# Patient Record
Sex: FEMALE | Race: WHITE | NOT HISPANIC OR LATINO | Employment: FULL TIME | RURAL
[De-identification: names, ages, dates, MRNs, and addresses within clinical notes are randomized per-mention and may not be internally consistent; named-entity substitution may affect disease eponyms.]

---

## 2020-06-23 ENCOUNTER — HISTORICAL (OUTPATIENT)
Dept: ADMINISTRATIVE | Facility: HOSPITAL | Age: 65
End: 2020-06-23

## 2020-12-02 ENCOUNTER — HISTORICAL (OUTPATIENT)
Dept: ADMINISTRATIVE | Facility: HOSPITAL | Age: 65
End: 2020-12-02

## 2021-06-02 ENCOUNTER — HOSPITAL ENCOUNTER (OUTPATIENT)
Dept: RADIOLOGY | Facility: HOSPITAL | Age: 66
Discharge: HOME | End: 2021-06-02
Attending: ORTHOPAEDIC SURGERY
Payer: COMMERCIAL

## 2021-06-02 DIAGNOSIS — M25.562 ACUTE PAIN OF LEFT KNEE: ICD-10-CM

## 2021-06-02 PROCEDURE — 73562 X-RAY EXAM OF KNEE 3: CPT | Mod: TC,LT

## 2021-06-21 PROBLEM — S83.242A ACUTE MEDIAL MENISCUS TEAR OF LEFT KNEE: Status: ACTIVE | Noted: 2021-06-21

## 2021-07-08 ENCOUNTER — HOSPITAL ENCOUNTER (OUTPATIENT)
Facility: HOSPITAL | Age: 66
Discharge: HOME | End: 2021-07-08
Attending: ORTHOPAEDIC SURGERY | Admitting: ORTHOPAEDIC SURGERY
Payer: COMMERCIAL

## 2021-07-08 ENCOUNTER — ANESTHESIA (OUTPATIENT)
Dept: SURGERY | Facility: HOSPITAL | Age: 66
End: 2021-07-08
Payer: COMMERCIAL

## 2021-07-08 ENCOUNTER — ANESTHESIA EVENT (OUTPATIENT)
Dept: SURGERY | Facility: HOSPITAL | Age: 66
End: 2021-07-08
Payer: COMMERCIAL

## 2021-07-08 VITALS
WEIGHT: 141 LBS | BODY MASS INDEX: 23.49 KG/M2 | HEIGHT: 65 IN | HEART RATE: 80 BPM | OXYGEN SATURATION: 92 % | RESPIRATION RATE: 16 BRPM | TEMPERATURE: 98 F | SYSTOLIC BLOOD PRESSURE: 141 MMHG | DIASTOLIC BLOOD PRESSURE: 84 MMHG

## 2021-07-08 DIAGNOSIS — M23.204 OLD COMPLEX TEAR OF MEDIAL MENISCUS OF LEFT KNEE: Primary | ICD-10-CM

## 2021-07-08 PROCEDURE — D9220A PRA ANESTHESIA: Mod: ANES,,, | Performed by: ANESTHESIOLOGY

## 2021-07-08 PROCEDURE — 63600175 PHARM REV CODE 636 W HCPCS: Performed by: NURSE ANESTHETIST, CERTIFIED REGISTERED

## 2021-07-08 PROCEDURE — D9220A PRA ANESTHESIA: ICD-10-PCS | Mod: ANES,,, | Performed by: ANESTHESIOLOGY

## 2021-07-08 PROCEDURE — 36000711: Performed by: ORTHOPAEDIC SURGERY

## 2021-07-08 PROCEDURE — 27000655: Performed by: ANESTHESIOLOGY

## 2021-07-08 PROCEDURE — 25000003 PHARM REV CODE 250: Performed by: NURSE ANESTHETIST, CERTIFIED REGISTERED

## 2021-07-08 PROCEDURE — 27000716 HC OXISENSOR PROBE, ANY SIZE: Performed by: ANESTHESIOLOGY

## 2021-07-08 PROCEDURE — 71000015 HC POSTOP RECOV 1ST HR: Performed by: ORTHOPAEDIC SURGERY

## 2021-07-08 PROCEDURE — 97161 PT EVAL LOW COMPLEX 20 MIN: CPT

## 2021-07-08 PROCEDURE — 37000008 HC ANESTHESIA 1ST 15 MINUTES: Performed by: ORTHOPAEDIC SURGERY

## 2021-07-08 PROCEDURE — 25000003 PHARM REV CODE 250: Performed by: ANESTHESIOLOGY

## 2021-07-08 PROCEDURE — 36000710: Performed by: ORTHOPAEDIC SURGERY

## 2021-07-08 PROCEDURE — D9220A PRA ANESTHESIA: Mod: CRNA,,, | Performed by: NURSE ANESTHETIST, CERTIFIED REGISTERED

## 2021-07-08 PROCEDURE — 27201423 OPTIME MED/SURG SUP & DEVICES STERILE SUPPLY: Performed by: ORTHOPAEDIC SURGERY

## 2021-07-08 PROCEDURE — 27000260 *HC AIRWAY ORAL: Performed by: ANESTHESIOLOGY

## 2021-07-08 PROCEDURE — 25000003 PHARM REV CODE 250: Performed by: ORTHOPAEDIC SURGERY

## 2021-07-08 PROCEDURE — D9220A PRA ANESTHESIA: ICD-10-PCS | Mod: CRNA,,, | Performed by: NURSE ANESTHETIST, CERTIFIED REGISTERED

## 2021-07-08 PROCEDURE — 27000177 HC AIRWAY, LARYNGEAL MASK: Performed by: ANESTHESIOLOGY

## 2021-07-08 PROCEDURE — 71000033 HC RECOVERY, INTIAL HOUR: Performed by: ORTHOPAEDIC SURGERY

## 2021-07-08 PROCEDURE — 37000009 HC ANESTHESIA EA ADD 15 MINS: Performed by: ORTHOPAEDIC SURGERY

## 2021-07-08 PROCEDURE — 71000016 HC POSTOP RECOV ADDL HR: Performed by: ORTHOPAEDIC SURGERY

## 2021-07-08 PROCEDURE — 27000510 HC BLANKET BAIR HUGGER ANY SIZE: Performed by: ANESTHESIOLOGY

## 2021-07-08 RX ORDER — MIDAZOLAM HYDROCHLORIDE 1 MG/ML
INJECTION INTRAMUSCULAR; INTRAVENOUS
Status: DISCONTINUED | OUTPATIENT
Start: 2021-07-08 | End: 2021-07-08

## 2021-07-08 RX ORDER — DIPHENHYDRAMINE HYDROCHLORIDE 50 MG/ML
25 INJECTION INTRAMUSCULAR; INTRAVENOUS EVERY 6 HOURS PRN
Status: DISCONTINUED | OUTPATIENT
Start: 2021-07-08 | End: 2021-07-08 | Stop reason: HOSPADM

## 2021-07-08 RX ORDER — DIMENHYDRINATE 50 MG
50 TABLET ORAL ONCE
Status: COMPLETED | OUTPATIENT
Start: 2021-07-08 | End: 2021-07-08

## 2021-07-08 RX ORDER — OXYCODONE HYDROCHLORIDE 5 MG/1
5 TABLET ORAL
Status: DISCONTINUED | OUTPATIENT
Start: 2021-07-08 | End: 2021-07-08 | Stop reason: HOSPADM

## 2021-07-08 RX ORDER — ONDANSETRON 4 MG/1
8 TABLET, ORALLY DISINTEGRATING ORAL EVERY 8 HOURS PRN
Status: DISCONTINUED | OUTPATIENT
Start: 2021-07-08 | End: 2021-07-08 | Stop reason: HOSPADM

## 2021-07-08 RX ORDER — CEFAZOLIN SODIUM 2 G/50ML
2 SOLUTION INTRAVENOUS
Status: DISCONTINUED | OUTPATIENT
Start: 2021-07-08 | End: 2021-07-08 | Stop reason: HOSPADM

## 2021-07-08 RX ORDER — MEPERIDINE HYDROCHLORIDE 25 MG/ML
25 INJECTION INTRAMUSCULAR; INTRAVENOUS; SUBCUTANEOUS EVERY 10 MIN PRN
Status: DISCONTINUED | OUTPATIENT
Start: 2021-07-08 | End: 2021-07-08 | Stop reason: HOSPADM

## 2021-07-08 RX ORDER — DEXAMETHASONE SODIUM PHOSPHATE 100 MG/10ML
INJECTION INTRAMUSCULAR; INTRAVENOUS
Status: DISCONTINUED | OUTPATIENT
Start: 2021-07-08 | End: 2021-07-08

## 2021-07-08 RX ORDER — CEFAZOLIN SODIUM 1 G/3ML
INJECTION, POWDER, FOR SOLUTION INTRAMUSCULAR; INTRAVENOUS
Status: DISCONTINUED | OUTPATIENT
Start: 2021-07-08 | End: 2021-07-08

## 2021-07-08 RX ORDER — ONDANSETRON 2 MG/ML
4 INJECTION INTRAMUSCULAR; INTRAVENOUS DAILY PRN
Status: DISCONTINUED | OUTPATIENT
Start: 2021-07-08 | End: 2021-07-08 | Stop reason: HOSPADM

## 2021-07-08 RX ORDER — FENTANYL CITRATE 50 UG/ML
INJECTION, SOLUTION INTRAMUSCULAR; INTRAVENOUS
Status: DISCONTINUED | OUTPATIENT
Start: 2021-07-08 | End: 2021-07-08

## 2021-07-08 RX ORDER — HYDROCODONE BITARTRATE AND ACETAMINOPHEN 5; 325 MG/1; MG/1
1 TABLET ORAL EVERY 4 HOURS PRN
Status: DISCONTINUED | OUTPATIENT
Start: 2021-07-08 | End: 2021-07-08 | Stop reason: HOSPADM

## 2021-07-08 RX ORDER — ACETAMINOPHEN 500 MG
1000 TABLET ORAL EVERY 6 HOURS PRN
Status: DISCONTINUED | OUTPATIENT
Start: 2021-07-08 | End: 2021-07-08 | Stop reason: HOSPADM

## 2021-07-08 RX ORDER — HYDROCODONE BITARTRATE AND ACETAMINOPHEN 5; 325 MG/1; MG/1
1 TABLET ORAL EVERY 6 HOURS PRN
Qty: 28 TABLET | Refills: 0 | Status: SHIPPED | OUTPATIENT
Start: 2021-07-08 | End: 2021-07-15

## 2021-07-08 RX ORDER — LIDOCAINE HYDROCHLORIDE 20 MG/ML
INJECTION, SOLUTION EPIDURAL; INFILTRATION; INTRACAUDAL; PERINEURAL
Status: DISCONTINUED | OUTPATIENT
Start: 2021-07-08 | End: 2021-07-08

## 2021-07-08 RX ORDER — SODIUM CHLORIDE 9 MG/ML
INJECTION, SOLUTION INTRAVENOUS CONTINUOUS
Status: DISCONTINUED | OUTPATIENT
Start: 2021-07-08 | End: 2021-07-08 | Stop reason: HOSPADM

## 2021-07-08 RX ORDER — MORPHINE SULFATE 10 MG/ML
4 INJECTION INTRAMUSCULAR; INTRAVENOUS; SUBCUTANEOUS EVERY 5 MIN PRN
Status: DISCONTINUED | OUTPATIENT
Start: 2021-07-08 | End: 2021-07-08 | Stop reason: HOSPADM

## 2021-07-08 RX ORDER — SCOLOPAMINE TRANSDERMAL SYSTEM 1 MG/1
1 PATCH, EXTENDED RELEASE TRANSDERMAL ONCE
Status: DISCONTINUED | OUTPATIENT
Start: 2021-07-08 | End: 2021-07-08 | Stop reason: HOSPADM

## 2021-07-08 RX ORDER — PROMETHAZINE HYDROCHLORIDE 25 MG/1
25 TABLET ORAL EVERY 6 HOURS PRN
Status: DISCONTINUED | OUTPATIENT
Start: 2021-07-08 | End: 2021-07-08 | Stop reason: HOSPADM

## 2021-07-08 RX ORDER — ONDANSETRON 2 MG/ML
INJECTION INTRAMUSCULAR; INTRAVENOUS
Status: DISCONTINUED | OUTPATIENT
Start: 2021-07-08 | End: 2021-07-08

## 2021-07-08 RX ORDER — HYDROCODONE BITARTRATE AND ACETAMINOPHEN 10; 325 MG/1; MG/1
1 TABLET ORAL EVERY 4 HOURS PRN
Status: DISCONTINUED | OUTPATIENT
Start: 2021-07-08 | End: 2021-07-08 | Stop reason: HOSPADM

## 2021-07-08 RX ORDER — HYDROMORPHONE HYDROCHLORIDE 2 MG/ML
0.5 INJECTION, SOLUTION INTRAMUSCULAR; INTRAVENOUS; SUBCUTANEOUS EVERY 5 MIN PRN
Status: DISCONTINUED | OUTPATIENT
Start: 2021-07-08 | End: 2021-07-08 | Stop reason: HOSPADM

## 2021-07-08 RX ORDER — PROPOFOL 10 MG/ML
VIAL (ML) INTRAVENOUS
Status: DISCONTINUED | OUTPATIENT
Start: 2021-07-08 | End: 2021-07-08

## 2021-07-08 RX ADMIN — ONDANSETRON 4 MG: 2 INJECTION INTRAMUSCULAR; INTRAVENOUS at 01:07

## 2021-07-08 RX ADMIN — SODIUM CHLORIDE: 9 INJECTION, SOLUTION INTRAVENOUS at 10:07

## 2021-07-08 RX ADMIN — FENTANYL CITRATE 50 MCG: 50 INJECTION INTRAMUSCULAR; INTRAVENOUS at 01:07

## 2021-07-08 RX ADMIN — DEXAMETHASONE SODIUM PHOSPHATE 8 MG: 10 INJECTION INTRAMUSCULAR; INTRAVENOUS at 01:07

## 2021-07-08 RX ADMIN — LIDOCAINE HYDROCHLORIDE 100 MG: 20 INJECTION, SOLUTION INTRAVENOUS at 12:07

## 2021-07-08 RX ADMIN — MIDAZOLAM HYDROCHLORIDE 2 MG: 1 INJECTION, SOLUTION INTRAMUSCULAR; INTRAVENOUS at 12:07

## 2021-07-08 RX ADMIN — PROPOFOL 150 MG: 10 INJECTION, EMULSION INTRAVENOUS at 12:07

## 2021-07-08 RX ADMIN — SODIUM CHLORIDE: 9 INJECTION, SOLUTION INTRAVENOUS at 01:07

## 2021-07-08 RX ADMIN — DIMENHYDRINATE 50 MG: 50 TABLET ORAL at 12:07

## 2021-07-08 RX ADMIN — SCOPALAMINE 1 PATCH: 1 PATCH, EXTENDED RELEASE TRANSDERMAL at 12:07

## 2021-07-08 RX ADMIN — CEFAZOLIN 2 G: 1 INJECTION, POWDER, FOR SOLUTION INTRAMUSCULAR; INTRAVENOUS; PARENTERAL at 01:07

## 2021-07-22 PROBLEM — Z09 FOLLOW UP: Status: ACTIVE | Noted: 2021-07-22

## 2021-07-27 PROBLEM — G89.18 POSTOPERATIVE PAIN: Status: ACTIVE | Noted: 2021-07-27

## 2021-08-18 PROBLEM — M17.12 PRIMARY OSTEOARTHRITIS OF LEFT KNEE: Status: ACTIVE | Noted: 2021-08-18

## 2021-11-01 PROBLEM — G89.18 POSTOPERATIVE PAIN: Status: RESOLVED | Noted: 2021-07-27 | Resolved: 2021-11-01

## 2021-11-08 PROBLEM — Z09 POSTOP CHECK: Status: RESOLVED | Noted: 2021-07-22 | Resolved: 2021-11-08

## 2021-11-16 ENCOUNTER — OFFICE VISIT (OUTPATIENT)
Dept: FAMILY MEDICINE | Facility: CLINIC | Age: 66
End: 2021-11-16
Payer: COMMERCIAL

## 2021-11-16 VITALS
OXYGEN SATURATION: 95 % | RESPIRATION RATE: 18 BRPM | BODY MASS INDEX: 23.49 KG/M2 | HEART RATE: 105 BPM | SYSTOLIC BLOOD PRESSURE: 122 MMHG | TEMPERATURE: 99 F | DIASTOLIC BLOOD PRESSURE: 80 MMHG | HEIGHT: 65 IN | WEIGHT: 141 LBS

## 2021-11-16 DIAGNOSIS — J06.9 UPPER RESPIRATORY TRACT INFECTION, UNSPECIFIED TYPE: ICD-10-CM

## 2021-11-16 DIAGNOSIS — R50.9 FEVER, UNSPECIFIED FEVER CAUSE: Primary | ICD-10-CM

## 2021-11-16 LAB
CTP QC/QA: YES
FLUAV AG NPH QL: NEGATIVE
FLUBV AG NPH QL: NEGATIVE
SARS-COV-2 AG RESP QL IA.RAPID: NEGATIVE

## 2021-11-16 PROCEDURE — 3079F PR MOST RECENT DIASTOLIC BLOOD PRESSURE 80-89 MM HG: ICD-10-PCS | Mod: CPTII,,, | Performed by: FAMILY MEDICINE

## 2021-11-16 PROCEDURE — 99213 PR OFFICE/OUTPT VISIT, EST, LEVL III, 20-29 MIN: ICD-10-PCS | Mod: ,,, | Performed by: FAMILY MEDICINE

## 2021-11-16 PROCEDURE — 3079F DIAST BP 80-89 MM HG: CPT | Mod: CPTII,,, | Performed by: FAMILY MEDICINE

## 2021-11-16 PROCEDURE — 1159F MED LIST DOCD IN RCRD: CPT | Mod: CPTII,,, | Performed by: FAMILY MEDICINE

## 2021-11-16 PROCEDURE — 3074F PR MOST RECENT SYSTOLIC BLOOD PRESSURE < 130 MM HG: ICD-10-PCS | Mod: CPTII,,, | Performed by: FAMILY MEDICINE

## 2021-11-16 PROCEDURE — 87428 POCT SARS-COV2 (COVID) WITH FLU ANTIGEN: ICD-10-PCS | Mod: QW,,, | Performed by: FAMILY MEDICINE

## 2021-11-16 PROCEDURE — 3008F PR BODY MASS INDEX (BMI) DOCUMENTED: ICD-10-PCS | Mod: CPTII,,, | Performed by: FAMILY MEDICINE

## 2021-11-16 PROCEDURE — 3074F SYST BP LT 130 MM HG: CPT | Mod: CPTII,,, | Performed by: FAMILY MEDICINE

## 2021-11-16 PROCEDURE — 3008F BODY MASS INDEX DOCD: CPT | Mod: CPTII,,, | Performed by: FAMILY MEDICINE

## 2021-11-16 PROCEDURE — 99213 OFFICE O/P EST LOW 20 MIN: CPT | Mod: ,,, | Performed by: FAMILY MEDICINE

## 2021-11-16 PROCEDURE — 1159F PR MEDICATION LIST DOCUMENTED IN MEDICAL RECORD: ICD-10-PCS | Mod: CPTII,,, | Performed by: FAMILY MEDICINE

## 2021-11-16 PROCEDURE — 87428 SARSCOV & INF VIR A&B AG IA: CPT | Mod: QW,,, | Performed by: FAMILY MEDICINE

## 2021-11-16 RX ORDER — AMOXICILLIN AND CLAVULANATE POTASSIUM 875; 125 MG/1; MG/1
1 TABLET, FILM COATED ORAL EVERY 12 HOURS
Qty: 14 TABLET | Refills: 0 | Status: SHIPPED | OUTPATIENT
Start: 2021-11-16 | End: 2021-11-23

## 2021-11-16 RX ORDER — IPRATROPIUM BROMIDE 42 UG/1
2 SPRAY, METERED NASAL 3 TIMES DAILY
Qty: 15 ML | Refills: 1 | Status: SHIPPED | OUTPATIENT
Start: 2021-11-16 | End: 2022-06-28

## 2021-11-16 RX ORDER — GUAIFENESIN/DEXTROMETHORPHAN 100-10MG/5
5 SYRUP ORAL EVERY 6 HOURS PRN
Qty: 236 ML | Refills: 1 | Status: SHIPPED | OUTPATIENT
Start: 2021-11-16 | End: 2021-11-26

## 2021-12-08 ENCOUNTER — HOSPITAL ENCOUNTER (OUTPATIENT)
Dept: RADIOLOGY | Facility: HOSPITAL | Age: 66
Discharge: HOME | End: 2021-12-08
Attending: RADIOLOGY
Payer: COMMERCIAL

## 2021-12-08 ENCOUNTER — OFFICE VISIT (OUTPATIENT)
Dept: OBSTETRICS AND GYNECOLOGY | Facility: CLINIC | Age: 66
End: 2021-12-08
Payer: COMMERCIAL

## 2021-12-08 VITALS — WEIGHT: 141 LBS | HEIGHT: 65 IN | BODY MASS INDEX: 23.49 KG/M2

## 2021-12-08 VITALS
DIASTOLIC BLOOD PRESSURE: 72 MMHG | SYSTOLIC BLOOD PRESSURE: 136 MMHG | WEIGHT: 138 LBS | HEIGHT: 65 IN | BODY MASS INDEX: 22.99 KG/M2

## 2021-12-08 DIAGNOSIS — Z01.419 ENCNTR FOR GYN EXAM (GENERAL) (ROUTINE) W/O ABN FINDINGS: ICD-10-CM

## 2021-12-08 DIAGNOSIS — Z12.31 SCREENING MAMMOGRAM, ENCOUNTER FOR: ICD-10-CM

## 2021-12-08 DIAGNOSIS — Z12.4 SCREENING FOR MALIGNANT NEOPLASM OF THE CERVIX: Primary | ICD-10-CM

## 2021-12-08 PROCEDURE — 99213 OFFICE O/P EST LOW 20 MIN: CPT | Mod: PBBFAC | Performed by: OBSTETRICS & GYNECOLOGY

## 2021-12-08 PROCEDURE — 99396 PREV VISIT EST AGE 40-64: CPT | Mod: S$PBB,,, | Performed by: OBSTETRICS & GYNECOLOGY

## 2021-12-08 PROCEDURE — 87624 HUMAN PAPILLOMAVIRUS (HPV): ICD-10-PCS | Mod: ,,, | Performed by: CLINICAL MEDICAL LABORATORY

## 2021-12-08 PROCEDURE — 88142 CYTOPATH C/V THIN LAYER: CPT | Mod: GCY | Performed by: OBSTETRICS & GYNECOLOGY

## 2021-12-08 PROCEDURE — 87624 HPV HI-RISK TYP POOLED RSLT: CPT | Mod: ,,, | Performed by: CLINICAL MEDICAL LABORATORY

## 2021-12-08 PROCEDURE — 77067 SCR MAMMO BI INCL CAD: CPT | Mod: TC

## 2021-12-08 PROCEDURE — 99396 PR PREVENTIVE VISIT,EST,40-64: ICD-10-PCS | Mod: S$PBB,,, | Performed by: OBSTETRICS & GYNECOLOGY

## 2021-12-10 LAB
GH SERPL-MCNC: NORMAL NG/ML
INSULIN SERPL-ACNC: NORMAL U[IU]/ML
LAB AP CLINICAL INFORMATION: NORMAL
LAB AP GYN INTERPRETATION: NEGATIVE
LAB AP PAP DISCLAIMER COMMENTS: NORMAL
RENIN PLAS-CCNC: NORMAL NG/ML/H

## 2021-12-11 LAB
HPV 16: NEGATIVE
HPV 18: NEGATIVE
HPV OTHER: NEGATIVE

## 2022-06-15 ENCOUNTER — OFFICE VISIT (OUTPATIENT)
Dept: FAMILY MEDICINE | Facility: CLINIC | Age: 67
End: 2022-06-15
Payer: COMMERCIAL

## 2022-06-15 VITALS
OXYGEN SATURATION: 96 % | WEIGHT: 132.5 LBS | DIASTOLIC BLOOD PRESSURE: 88 MMHG | BODY MASS INDEX: 22.08 KG/M2 | RESPIRATION RATE: 18 BRPM | TEMPERATURE: 98 F | HEART RATE: 88 BPM | HEIGHT: 65 IN | SYSTOLIC BLOOD PRESSURE: 144 MMHG

## 2022-06-15 DIAGNOSIS — R53.83 FATIGUE, UNSPECIFIED TYPE: Primary | ICD-10-CM

## 2022-06-15 DIAGNOSIS — Z13.1 ENCOUNTER FOR SCREENING FOR DIABETES MELLITUS: ICD-10-CM

## 2022-06-15 DIAGNOSIS — Z13.220 SCREENING FOR LIPOID DISORDERS: ICD-10-CM

## 2022-06-15 LAB
25(OH)D3 SERPL-MCNC: 27.3 NG/ML
BASOPHILS # BLD AUTO: 0.04 K/UL (ref 0–0.2)
BASOPHILS NFR BLD AUTO: 0.6 % (ref 0–1)
CHOLEST SERPL-MCNC: 225 MG/DL (ref 0–200)
CHOLEST/HDLC SERPL: 2.9 {RATIO}
DIFFERENTIAL METHOD BLD: ABNORMAL
EOSINOPHIL # BLD AUTO: 0.13 K/UL (ref 0–0.5)
EOSINOPHIL NFR BLD AUTO: 2.1 % (ref 1–4)
ERYTHROCYTE [DISTWIDTH] IN BLOOD BY AUTOMATED COUNT: 13.2 % (ref 11.5–14.5)
GLUCOSE SERPL-MCNC: 104 MG/DL (ref 74–106)
HCT VFR BLD AUTO: 43 % (ref 38–47)
HDLC SERPL-MCNC: 78 MG/DL (ref 40–60)
HGB BLD-MCNC: 14.1 G/DL (ref 12–16)
IMM GRANULOCYTES # BLD AUTO: 0.02 K/UL (ref 0–0.04)
IMM GRANULOCYTES NFR BLD: 0.3 % (ref 0–0.4)
LDLC SERPL CALC-MCNC: 129 MG/DL
LDLC/HDLC SERPL: 1.7 {RATIO}
LYMPHOCYTES # BLD AUTO: 1.39 K/UL (ref 1–4.8)
LYMPHOCYTES NFR BLD AUTO: 22.4 % (ref 27–41)
MCH RBC QN AUTO: 29.3 PG (ref 27–31)
MCHC RBC AUTO-ENTMCNC: 32.8 G/DL (ref 32–36)
MCV RBC AUTO: 89.2 FL (ref 80–96)
MONOCYTES # BLD AUTO: 0.41 K/UL (ref 0–0.8)
MONOCYTES NFR BLD AUTO: 6.6 % (ref 2–6)
MPC BLD CALC-MCNC: 11.3 FL (ref 9.4–12.4)
NEUTROPHILS # BLD AUTO: 4.22 K/UL (ref 1.8–7.7)
NEUTROPHILS NFR BLD AUTO: 68 % (ref 53–65)
NONHDLC SERPL-MCNC: 147 MG/DL
NRBC # BLD AUTO: 0 X10E3/UL
NRBC, AUTO (.00): 0 %
PLATELET # BLD AUTO: 271 K/UL (ref 150–400)
RBC # BLD AUTO: 4.82 M/UL (ref 4.2–5.4)
TRIGL SERPL-MCNC: 91 MG/DL (ref 35–150)
TSH SERPL DL<=0.005 MIU/L-ACNC: 1.17 UIU/ML (ref 0.36–3.74)
VIT B12 SERPL-MCNC: 437 PG/ML (ref 193–986)
VLDLC SERPL-MCNC: 18 MG/DL
WBC # BLD AUTO: 6.21 K/UL (ref 4.5–11)

## 2022-06-15 PROCEDURE — 84443 ASSAY THYROID STIM HORMONE: CPT | Mod: ,,, | Performed by: CLINICAL MEDICAL LABORATORY

## 2022-06-15 PROCEDURE — 99396 PR PREVENTIVE VISIT,EST,40-64: ICD-10-PCS | Mod: ,,, | Performed by: NURSE PRACTITIONER

## 2022-06-15 PROCEDURE — 3077F SYST BP >= 140 MM HG: CPT | Mod: ,,, | Performed by: NURSE PRACTITIONER

## 2022-06-15 PROCEDURE — 80061 LIPID PANEL: CPT | Mod: ,,, | Performed by: CLINICAL MEDICAL LABORATORY

## 2022-06-15 PROCEDURE — 82607 VITAMIN B-12: CPT | Mod: ,,, | Performed by: CLINICAL MEDICAL LABORATORY

## 2022-06-15 PROCEDURE — 84443 TSH: ICD-10-PCS | Mod: ,,, | Performed by: CLINICAL MEDICAL LABORATORY

## 2022-06-15 PROCEDURE — 82947 GLUCOSE, FASTING: ICD-10-PCS | Mod: ,,, | Performed by: CLINICAL MEDICAL LABORATORY

## 2022-06-15 PROCEDURE — 3008F PR BODY MASS INDEX (BMI) DOCUMENTED: ICD-10-PCS | Mod: ,,, | Performed by: NURSE PRACTITIONER

## 2022-06-15 PROCEDURE — 1159F MED LIST DOCD IN RCRD: CPT | Mod: ,,, | Performed by: NURSE PRACTITIONER

## 2022-06-15 PROCEDURE — 82306 VITAMIN D: ICD-10-PCS | Mod: ,,, | Performed by: CLINICAL MEDICAL LABORATORY

## 2022-06-15 PROCEDURE — 85025 COMPLETE CBC W/AUTO DIFF WBC: CPT | Mod: ,,, | Performed by: CLINICAL MEDICAL LABORATORY

## 2022-06-15 PROCEDURE — 1159F PR MEDICATION LIST DOCUMENTED IN MEDICAL RECORD: ICD-10-PCS | Mod: ,,, | Performed by: NURSE PRACTITIONER

## 2022-06-15 PROCEDURE — 82306 VITAMIN D 25 HYDROXY: CPT | Mod: ,,, | Performed by: CLINICAL MEDICAL LABORATORY

## 2022-06-15 PROCEDURE — 3077F PR MOST RECENT SYSTOLIC BLOOD PRESSURE >= 140 MM HG: ICD-10-PCS | Mod: ,,, | Performed by: NURSE PRACTITIONER

## 2022-06-15 PROCEDURE — 85025 CBC WITH DIFFERENTIAL: ICD-10-PCS | Mod: ,,, | Performed by: CLINICAL MEDICAL LABORATORY

## 2022-06-15 PROCEDURE — 80061 LIPID PANEL: ICD-10-PCS | Mod: ,,, | Performed by: CLINICAL MEDICAL LABORATORY

## 2022-06-15 PROCEDURE — 3008F BODY MASS INDEX DOCD: CPT | Mod: ,,, | Performed by: NURSE PRACTITIONER

## 2022-06-15 PROCEDURE — 3079F PR MOST RECENT DIASTOLIC BLOOD PRESSURE 80-89 MM HG: ICD-10-PCS | Mod: ,,, | Performed by: NURSE PRACTITIONER

## 2022-06-15 PROCEDURE — 82607 VITAMIN B12: ICD-10-PCS | Mod: ,,, | Performed by: CLINICAL MEDICAL LABORATORY

## 2022-06-15 PROCEDURE — 82947 ASSAY GLUCOSE BLOOD QUANT: CPT | Mod: ,,, | Performed by: CLINICAL MEDICAL LABORATORY

## 2022-06-15 PROCEDURE — 99396 PREV VISIT EST AGE 40-64: CPT | Mod: ,,, | Performed by: NURSE PRACTITIONER

## 2022-06-15 PROCEDURE — 3079F DIAST BP 80-89 MM HG: CPT | Mod: ,,, | Performed by: NURSE PRACTITIONER

## 2022-06-15 NOTE — PROGRESS NOTES
Shana Olson NP   [facility]  [address] [address] [address]  [facility]     PATIENT NAME: Chaya Walls  : 1955  DATE: 6/15/22  MRN: [id number]      Billing Provider: Shana Olson NP  Level of Service:   Patient PCP Information     Provider PCP Type    Shana Olson NP General          Reason for Visit / Chief Complaint: Healthy You       Update PCP  Update Chief Complaint         History of Present Illness / Problem Focused Workflow     Chaya Walls presents to the clinic   Here for c/o feeling fatigue for brandon 1 year, and for healthy you, pt says she is utd on pap and mammogram    Review of Systems     Review of Systems   Constitutional: Positive for fatigue. Negative for chills and fever.   HENT: Negative for nasal congestion, ear pain, facial swelling, hearing loss, mouth dryness, mouth sores, postnasal drip, rhinorrhea, sinus pressure/congestion and goiter.    Eyes: Negative for discharge and itching.   Respiratory: Negative for cough, shortness of breath and wheezing.    Cardiovascular: Negative for chest pain and leg swelling.   Gastrointestinal: Negative for abdominal pain, change in bowel habit and change in bowel habit.   Genitourinary: Negative for difficulty urinating, dysuria, enuresis, frequency, hematuria and urgency.   Neurological: Negative for dizziness, vertigo, syncope, weakness and headaches.   Psychiatric/Behavioral: Negative for decreased concentration.   All other systems reviewed and are negative.       Medical / Social / Family History     Past Medical History:   Diagnosis Date    Breast cancer     Known health problems: none        Past Surgical History:   Procedure Laterality Date    ARTHROSCOPY OF KNEE Left 2021    Procedure: ARTHROSCOPY, KNEE;  Surgeon: Pino Varma MD;  Location: [facility];  Service: Orthopedics;  Laterality: Left;    BREAST BIOPSY      BREAST LUMPECTOMY      CARDIAC CATHETERIZATION      CHOLECYSTECTOMY      DILATION AND CURETTAGE OF  "UTERUS      with hysteroscopy    LUMPECTOMY,BREAST, WITH RADIOACTIVE SEED LOCALIZATION AND SENTINEL LYMPH NODE BIOPSY Left     SHOULDER ARTHROSCOPY Bilateral     TUBAL LIGATION         Social History  Ms.  reports that she has quit smoking. She has never used smokeless tobacco. She reports that she does not drink alcohol and does not use drugs.    Family History  Ms.'s family history includes Breast cancer in her daughter; Heart disease in her father; Hypertension in her mother; No Known Problems in her brother and sister.    Medications and Allergies     Medications  No outpatient medications have been marked as taking for the 6/15/22 encounter (Office Visit) with Shana Olson NP.       Allergies  Review of patient's allergies indicates:   Allergen Reactions    Sulfa (sulfonamide antibiotics)        Physical Examination     Vitals:    06/15/22 0902   BP: (!) 144/88   BP Location: Right arm   Patient Position: Sitting   BP Method: Medium (Manual)   Pulse: 88   Resp: 18   Temp: 97.9 °F (36.6 °C)   TempSrc: Oral   SpO2: 96%   Weight: 60.1 kg (132 lb 8 oz)   Height: 5' 5" (1.651 m)      Physical Exam  Vitals and nursing note reviewed.   Constitutional:       Appearance: Normal appearance.   HENT:      Head: Normocephalic.      Right Ear: Tympanic membrane, ear canal and external ear normal.      Left Ear: Tympanic membrane, ear canal and external ear normal.      Nose: Nose normal.      Mouth/Throat:      Mouth: Mucous membranes are moist.      Pharynx: Oropharynx is clear.   Eyes:      Extraocular Movements: Extraocular movements intact.      Conjunctiva/sclera: Conjunctivae normal.      Pupils: Pupils are equal, round, and reactive to light.   Cardiovascular:      Rate and Rhythm: Normal rate and regular rhythm.      Pulses: Normal pulses.      Heart sounds: Normal heart sounds.   Pulmonary:      Effort: Pulmonary effort is normal.      Breath sounds: Normal breath sounds.   Abdominal:      General: Bowel " sounds are normal.      Palpations: Abdomen is soft.   Musculoskeletal:         General: Normal range of motion.   Skin:     General: Skin is warm and dry.      Capillary Refill: Capillary refill takes less than 2 seconds.   Neurological:      General: No focal deficit present.      Mental Status: She is alert and oriented to person, place, and time.   Psychiatric:         Mood and Affect: Mood normal.         Behavior: Behavior normal.          Assessment and Plan (including Health Maintenance)      Problem List  [employer]  Document Outside HM   :    Plan: take multivitamin daily, will check cbc, tsh, lipids, vit d and vit b12 today, will call with results as soon as reviewed. rturn to clinic as needed and in 6 months    Fatigue, unspecified type  -     TSH; Future; Expected date: 06/15/2022  -     CBC Auto Differential; Future; Expected date: 06/15/2022  -     Vitamin D; Future; Expected date: 06/15/2022  -     Vitamin B12; Future; Expected date: 06/15/2022    Screening for lipoid disorders  -     Lipid Panel; Future; Expected date: 06/15/2022    Encounter for screening for diabetes mellitus  -     Glucose, Fasting; Future; Expected date: 06/15/2022            Health Maintenance Due   Topic Date Due    Hepatitis C Screening  Never done    COVID-19 Vaccine (1) Never done    HIV Screening  Never done    TETANUS VACCINE  Never done    Shingles Vaccine (1 of 2) Never done       Problem List Items Addressed This Visit    None     Visit Diagnoses     Fatigue, unspecified type    -  Primary    Relevant Orders    TSH    CBC Auto Differential    Vitamin D    Vitamin B12    Screening for lipoid disorders        Relevant Orders    Lipid Panel    Encounter for screening for diabetes mellitus        Relevant Orders    Glucose, Fasting            Health Maintenance Topics with due status: Not Due       Topic Last Completion Date    Colorectal Cancer Screening 01/06/2020    Lipid Panel 07/19/2021    Cervical Cancer  Screening 12/08/2021    Mammogram 12/08/2021    Influenza Vaccine Not Due       Procedures     Future Appointments   Date Time Provider Department Center   12/12/2022  9:00 AM Shana Olson NP [facility]   12/14/2022  9:30 AM [facility]1 [facility] [place]   12/14/2022 10:00 AM Wilner Palomino MD [facility] OBGYN [facility]   6/15/2023  9:00 AM Shana Olson NP [facility]        No follow-ups on file.       Signature:  Shana Olson NP    Date of encounter: 6/15/22

## 2022-06-16 NOTE — PROGRESS NOTES
Vit d is low, needs to be on dosequin 1 tablet daily and recheck in 3 months, chol sl elevated, all other lab is good

## 2022-06-23 RX ORDER — CHOLECALCIFEROL (VITD3)/VIT K2 137.5-2
1 TABLET ORAL DAILY
Qty: 90 TABLET | Refills: 0 | Status: SHIPPED | OUTPATIENT
Start: 2022-06-23 | End: 2022-12-01

## 2022-06-28 ENCOUNTER — OFFICE VISIT (OUTPATIENT)
Dept: FAMILY MEDICINE | Facility: CLINIC | Age: 67
End: 2022-06-28
Payer: COMMERCIAL

## 2022-06-28 VITALS
TEMPERATURE: 99 F | HEART RATE: 80 BPM | RESPIRATION RATE: 20 BRPM | HEIGHT: 65 IN | OXYGEN SATURATION: 94 % | WEIGHT: 135.81 LBS | BODY MASS INDEX: 22.63 KG/M2 | DIASTOLIC BLOOD PRESSURE: 86 MMHG | SYSTOLIC BLOOD PRESSURE: 138 MMHG

## 2022-06-28 DIAGNOSIS — J01.00 ACUTE NON-RECURRENT MAXILLARY SINUSITIS: Primary | ICD-10-CM

## 2022-06-28 DIAGNOSIS — Z20.828 EXPOSURE TO SARS VIRUS: ICD-10-CM

## 2022-06-28 PROCEDURE — 96372 PR INJECTION,THERAP/PROPH/DIAG2ST, IM OR SUBCUT: ICD-10-PCS | Mod: ,,, | Performed by: FAMILY MEDICINE

## 2022-06-28 PROCEDURE — 1159F MED LIST DOCD IN RCRD: CPT | Mod: ,,, | Performed by: FAMILY MEDICINE

## 2022-06-28 PROCEDURE — 3079F PR MOST RECENT DIASTOLIC BLOOD PRESSURE 80-89 MM HG: ICD-10-PCS | Mod: ,,, | Performed by: FAMILY MEDICINE

## 2022-06-28 PROCEDURE — 87428 SARSCOV & INF VIR A&B AG IA: CPT | Mod: QW,,, | Performed by: FAMILY MEDICINE

## 2022-06-28 PROCEDURE — 99213 OFFICE O/P EST LOW 20 MIN: CPT | Mod: 25,,, | Performed by: FAMILY MEDICINE

## 2022-06-28 PROCEDURE — 87428 POCT SARS-COV2 (COVID) WITH FLU ANTIGEN: ICD-10-PCS | Mod: QW,,, | Performed by: FAMILY MEDICINE

## 2022-06-28 PROCEDURE — 3079F DIAST BP 80-89 MM HG: CPT | Mod: ,,, | Performed by: FAMILY MEDICINE

## 2022-06-28 PROCEDURE — 3075F SYST BP GE 130 - 139MM HG: CPT | Mod: ,,, | Performed by: FAMILY MEDICINE

## 2022-06-28 PROCEDURE — 1160F RVW MEDS BY RX/DR IN RCRD: CPT | Mod: ,,, | Performed by: FAMILY MEDICINE

## 2022-06-28 PROCEDURE — 3008F PR BODY MASS INDEX (BMI) DOCUMENTED: ICD-10-PCS | Mod: ,,, | Performed by: FAMILY MEDICINE

## 2022-06-28 PROCEDURE — 99213 PR OFFICE/OUTPT VISIT, EST, LEVL III, 20-29 MIN: ICD-10-PCS | Mod: 25,,, | Performed by: FAMILY MEDICINE

## 2022-06-28 PROCEDURE — 3075F PR MOST RECENT SYSTOLIC BLOOD PRESS GE 130-139MM HG: ICD-10-PCS | Mod: ,,, | Performed by: FAMILY MEDICINE

## 2022-06-28 PROCEDURE — 1160F PR REVIEW ALL MEDS BY PRESCRIBER/CLIN PHARMACIST DOCUMENTED: ICD-10-PCS | Mod: ,,, | Performed by: FAMILY MEDICINE

## 2022-06-28 PROCEDURE — 1159F PR MEDICATION LIST DOCUMENTED IN MEDICAL RECORD: ICD-10-PCS | Mod: ,,, | Performed by: FAMILY MEDICINE

## 2022-06-28 PROCEDURE — 96372 THER/PROPH/DIAG INJ SC/IM: CPT | Mod: ,,, | Performed by: FAMILY MEDICINE

## 2022-06-28 PROCEDURE — 3008F BODY MASS INDEX DOCD: CPT | Mod: ,,, | Performed by: FAMILY MEDICINE

## 2022-06-28 RX ORDER — CEFTRIAXONE 1 G/1
1 INJECTION, POWDER, FOR SOLUTION INTRAMUSCULAR; INTRAVENOUS
Status: COMPLETED | OUTPATIENT
Start: 2022-06-28 | End: 2022-06-28

## 2022-06-28 RX ORDER — METHYLPREDNISOLONE ACETATE 40 MG/ML
40 INJECTION, SUSPENSION INTRA-ARTICULAR; INTRALESIONAL; INTRAMUSCULAR; SOFT TISSUE
Status: COMPLETED | OUTPATIENT
Start: 2022-06-28 | End: 2022-06-28

## 2022-06-28 RX ORDER — AMOXICILLIN AND CLAVULANATE POTASSIUM 875; 125 MG/1; MG/1
1 TABLET, FILM COATED ORAL EVERY 12 HOURS
Qty: 14 TABLET | Refills: 0 | Status: SHIPPED | OUTPATIENT
Start: 2022-06-28 | End: 2022-07-05

## 2022-06-28 RX ADMIN — CEFTRIAXONE 1 G: 1 INJECTION, POWDER, FOR SOLUTION INTRAMUSCULAR; INTRAVENOUS at 04:06

## 2022-06-28 RX ADMIN — METHYLPREDNISOLONE ACETATE 40 MG: 40 INJECTION, SUSPENSION INTRA-ARTICULAR; INTRALESIONAL; INTRAMUSCULAR; SOFT TISSUE at 04:06

## 2022-06-28 NOTE — PROGRESS NOTES
Marisol Fremin MD        PATIENT NAME: Chaya Walls  : 1955  DATE: 22  MRN: [id number]      Billing Provider: Marisol Fermin MD  Level of Service:   Patient PCP Information     Provider PCP Type    Shana Olson NP General          Reason for Visit / Chief Complaint: Sinus Problem (Started last Wednesday with a sinus presssure), Cough, Headache, and Nasal Congestion       History of Present Illness      Chaya Walls presents to the clinic with Sinus Problem (Started last Wednesday with a sinus presssure), Cough, Headache, and Nasal Congestion     Sinus Problem  Associated symptoms include coughing and headaches. Pertinent negatives include no shortness of breath.   Cough  Associated symptoms include headaches. Pertinent negatives include no fever or shortness of breath. Her past medical history is significant for environmental allergies.   Headache   Associated symptoms include coughing. Pertinent negatives include no fever.       Review of Systems     Review of Systems   Constitutional: Negative for activity change, appetite change, fatigue and fever.   Respiratory: Positive for cough. Negative for shortness of breath.    Allergic/Immunologic: Positive for environmental allergies.   Neurological: Positive for headaches.   Psychiatric/Behavioral: Negative for agitation, behavioral problems and suicidal ideas.       Medical / Social / Family History     Past Medical History:   Diagnosis Date    Breast cancer     Known health problems: none        Past Surgical History:   Procedure Laterality Date    ARTHROSCOPY OF KNEE Left 2021    Procedure: ARTHROSCOPY, KNEE;  Surgeon: Pino Varma MD;  Location: [facility];  Service: Orthopedics;  Laterality: Left;    BREAST BIOPSY      BREAST LUMPECTOMY      CARDIAC CATHETERIZATION      CHOLECYSTECTOMY      DILATION AND CURETTAGE OF UTERUS      with hysteroscopy    LUMPECTOMY,BREAST, WITH RADIOACTIVE SEED LOCALIZATION AND SENTINEL  "LYMPH NODE BIOPSY Left     SHOULDER ARTHROSCOPY Bilateral     TUBAL LIGATION         Social History  Ms.  reports that she has quit smoking. She has never used smokeless tobacco. She reports that she does not drink alcohol and does not use drugs.    Family History  Ms.'s family history includes Breast cancer in her daughter; Heart disease in her father; Hypertension in her mother; No Known Problems in her brother and sister.    Medications and Allergies     Medications  Outpatient Medications Marked as Taking for the 6/28/22 encounter (Office Visit) with Marisol Fermin MD   Medication Sig Dispense Refill    multivitamin (THERAGRAN) per tablet Take 1 tablet by mouth once daily.      vitamin D3-vitamin K2 (DOSOQUIN) 5,500-200 unit-mcg Tab Take 1 tablet by mouth once daily at 6am. 90 tablet 0     Current Facility-Administered Medications for the 6/28/22 encounter (Office Visit) with Marisol Fermin MD   Medication Dose Route Frequency Provider Last Rate Last Admin    [COMPLETED] cefTRIAXone injection 1 g  1 g Intramuscular 1 time in Clinic/HOD Marisol Fermin MD   1 g at 06/28/22 1609    [COMPLETED] methylPREDNISolone acetate injection 40 mg  40 mg Intramuscular 1 time in Clinic/HOD Marisol Fermin MD   40 mg at 06/28/22 1610       Allergies  Review of patient's allergies indicates:   Allergen Reactions    Sulfa (sulfonamide antibiotics)        Physical Examination   /86 (BP Location: Left arm, Patient Position: Sitting, BP Method: Medium (Automatic))   Pulse 80   Temp 98.6 °F (37 °C) (Oral)   Resp 20   Ht 5' 5" (1.651 m)   Wt 61.6 kg (135 lb 12.8 oz)   SpO2 (!) 94%   BMI 22.60 kg/m²     Physical Exam  Constitutional:       Appearance: Normal appearance. She is normal weight.   HENT:      Right Ear: Tympanic membrane is bulging.      Left Ear: Tympanic membrane is bulging.      Nose: Congestion present.   Cardiovascular:      Rate and Rhythm: Normal rate and regular rhythm.      Pulses: Normal pulses. "      Heart sounds: Normal heart sounds.   Musculoskeletal:         General: Normal range of motion.   Skin:     General: Skin is warm.   Neurological:      General: No focal deficit present.      Mental Status: She is alert.   Psychiatric:         Mood and Affect: Mood normal.         Behavior: Behavior normal.         Judgment: Judgment normal.         Recent Results (from the past 24 hour(s))   POCT SARS-COV2 (COVID) with Flu Antigen    Collection Time: 06/28/22  3:15 PM   Result Value Ref Range    SARS Coronavirus 2 Antigen Negative Negative    Rapid Influenza A Ag Negative Negative    Rapid Influenza B Ag Negative Negative     Acceptable Yes         Assessment and Plan (including Health Maintenance)     Plan:         Problem List Items Addressed This Visit    None     Visit Diagnoses     Acute non-recurrent maxillary sinusitis    -  Primary    Relevant Medications    cefTRIAXone injection 1 g (Completed)    methylPREDNISolone acetate injection 40 mg (Completed)    amoxicillin-clavulanate 875-125mg (AUGMENTIN) 875-125 mg per tablet    Exposure to SARS virus        Relevant Orders    POCT SARS-COV2 (COVID) with Flu Antigen (Completed)            Follow up if symptoms worsen or fail to improve.        Signature:  Marisol Fermin MD      Date of encounter: 6/28/22

## 2022-08-30 ENCOUNTER — OFFICE VISIT (OUTPATIENT)
Dept: FAMILY MEDICINE | Facility: CLINIC | Age: 67
End: 2022-08-30
Payer: COMMERCIAL

## 2022-08-30 VITALS
HEIGHT: 65 IN | HEART RATE: 92 BPM | BODY MASS INDEX: 22.69 KG/M2 | DIASTOLIC BLOOD PRESSURE: 82 MMHG | RESPIRATION RATE: 18 BRPM | OXYGEN SATURATION: 96 % | TEMPERATURE: 98 F | SYSTOLIC BLOOD PRESSURE: 160 MMHG | WEIGHT: 136.19 LBS

## 2022-08-30 DIAGNOSIS — R05.9 COUGH: ICD-10-CM

## 2022-08-30 DIAGNOSIS — U07.1 COVID: Primary | ICD-10-CM

## 2022-08-30 LAB
CTP QC/QA: YES
FLUAV AG NPH QL: NEGATIVE
FLUBV AG NPH QL: NEGATIVE
SARS-COV-2 AG RESP QL IA.RAPID: POSITIVE

## 2022-08-30 PROCEDURE — 3079F PR MOST RECENT DIASTOLIC BLOOD PRESSURE 80-89 MM HG: ICD-10-PCS | Mod: ,,, | Performed by: NURSE PRACTITIONER

## 2022-08-30 PROCEDURE — 3077F SYST BP >= 140 MM HG: CPT | Mod: ,,, | Performed by: NURSE PRACTITIONER

## 2022-08-30 PROCEDURE — 3077F PR MOST RECENT SYSTOLIC BLOOD PRESSURE >= 140 MM HG: ICD-10-PCS | Mod: ,,, | Performed by: NURSE PRACTITIONER

## 2022-08-30 PROCEDURE — 3008F BODY MASS INDEX DOCD: CPT | Mod: ,,, | Performed by: NURSE PRACTITIONER

## 2022-08-30 PROCEDURE — 3008F PR BODY MASS INDEX (BMI) DOCUMENTED: ICD-10-PCS | Mod: ,,, | Performed by: NURSE PRACTITIONER

## 2022-08-30 PROCEDURE — 1159F MED LIST DOCD IN RCRD: CPT | Mod: ,,, | Performed by: NURSE PRACTITIONER

## 2022-08-30 PROCEDURE — 87428 POCT SARS-COV2 (COVID) WITH FLU ANTIGEN: ICD-10-PCS | Mod: QW,,, | Performed by: NURSE PRACTITIONER

## 2022-08-30 PROCEDURE — 99214 PR OFFICE/OUTPT VISIT, EST, LEVL IV, 30-39 MIN: ICD-10-PCS | Mod: ,,, | Performed by: NURSE PRACTITIONER

## 2022-08-30 PROCEDURE — 87428 SARSCOV & INF VIR A&B AG IA: CPT | Mod: QW,,, | Performed by: NURSE PRACTITIONER

## 2022-08-30 PROCEDURE — 3079F DIAST BP 80-89 MM HG: CPT | Mod: ,,, | Performed by: NURSE PRACTITIONER

## 2022-08-30 PROCEDURE — 99214 OFFICE O/P EST MOD 30 MIN: CPT | Mod: ,,, | Performed by: NURSE PRACTITIONER

## 2022-08-30 PROCEDURE — 1159F PR MEDICATION LIST DOCUMENTED IN MEDICAL RECORD: ICD-10-PCS | Mod: ,,, | Performed by: NURSE PRACTITIONER

## 2022-08-30 RX ORDER — AZITHROMYCIN 250 MG/1
TABLET, FILM COATED ORAL
Qty: 6 TABLET | Refills: 0 | Status: SHIPPED | OUTPATIENT
Start: 2022-08-30 | End: 2022-09-04

## 2022-08-30 RX ORDER — BENZONATATE 100 MG/1
100 CAPSULE ORAL 3 TIMES DAILY PRN
Qty: 30 CAPSULE | Refills: 0 | Status: SHIPPED | OUTPATIENT
Start: 2022-08-30 | End: 2022-09-09

## 2022-08-30 NOTE — LETTER
September 1, 2022      [facility] [address] [place] [address]  Phone: [phone]  Fax: [phone]       Patient: Chaya Walls   YOB: 1955  Date of Visit: 09/01/2022    To Whom It May Concern:    Jake Walls, Leave# [id number], was at  on 08/30/2022.Pt was diagnosed with covid The patient may return to work on 09/05/2022 with no restrictions. If you have any questions or concerns, or if I can be of further assistance, please do not hesitate to contact me.    Sincerely,    Shana Olson NP

## 2022-08-30 NOTE — PATIENT INSTRUCTIONS
? If you develop symptoms get a test and stay home  *Applies to individuals who completed the primary series of Pfizer or Moderna ? Please let your provider know you have been exposed if you do go in for testing.  ? If you live in a household with a person who is a confirmed case of COVID-19, your last  exposure is when is when you last had contact less than 6 feet for 15 minutes or more.  ? During the 5 day quarantine at home you may be allowed Covid Medication List          Vitamin D 5,000 units twice a day     Zinc 50 mg twice a day     Pepcid 20 mg once a day     Zyrtec 10 mg once a day     Aspirin 325 mg once a day     Vitamin C 1000 mg twice a day     Melatonin 3 mg at bedtime     Increase fluid intake and rest!      Stay home until:    At least 5 days have passed since your symptoms began (or since your positive test, if you have no symptoms),    AND you have no more symptoms, or your symptoms are improving and you have no fever and do not need fever-reducing medication such as Tylenol (acetaminophen) or Advil (ibuprofen).    Remain home after 5 days as long as you have a fever. Remaining at home is important to prevent transmitting infection to others.      The [place] Department of [facility] ([facility]) recommends the following after  exposure to a COVID-19 infected person:  Any exposed individual who develops symptoms should be tested and stay home.  If you have had a booster shot OR you have been fully vaccinated but are not yet eligible for  a booster because of timing*, you should:  ? Wear a mask around others for 10 days.  ? Test on day 5, if possible.  ? If you develop symptoms get a test and stay home.  *Applies to individuals who completed the primary series of Pfizer or Moderna vaccine within the last 6  months OR completed the primary series of J&J vaccine within the last 2 months  If you are unvaccinated OR are currently eligible for a booster dose but have not yet received  one* you should:  ?  Stay home for 5 days. After that continue to wear a mask around others for 5 additional  days.  ? If you cant quarantine you must wear a mask for 10 days.  ? Test on day 5 if possible.vaccine over 6 months  ago and are not boosted OR completed the primary series of J&J over 2 months ago and are not  boosted  Additional considerations:to continue to work if your employer  says you are essential, and you continue to have no symptoms, undergo symptom and  temperature monitoring by your facility and wear a mask while you are at work and around  others. Contact your employer for approval.  o If you do return to work, you should continue to self-quarantine at home at all other  times.  Official CDC Update on Isolation and Quarantine can be found at  https://www.cdc.gov/media/releases/2021/s1227-isolation-quarantine-guidance.html

## 2022-08-30 NOTE — LETTER
August 30, 2022      [facility] [address] [place] [address]  Phone: [phone]  Fax: [phone]       Patient: Chaya Walls   YOB: 1955  Date of Visit: 08/30/2022    To Whom It May Concern:    Jake Walls  was at [facility] on 08/30/2022. The patient may return to work/school on 09/05/2022 with no restrictions. If you have any questions or concerns, or if I can be of further assistance, please do not hesitate to contact me.    Sincerely,    KRISTOFER Parson RN

## 2022-08-30 NOTE — PROGRESS NOTES
Shana Olson NP   [facility]  [address]  [facility]     PATIENT NAME: Chaya Walls  : 1955  DATE: 22  MRN: [id number]      Billing Provider: Shana Olson NP  Level of Service:   Patient PCP Information       Provider PCP Type    Shana Olson NP General            Reason for Visit / Chief Complaint: Sinus Problem (Sinus drainage, pressure, chest congestion started over the weekend. )       Update PCP  Update Chief Complaint         History of Present Illness / Problem Focused Workflow     Chaya Walls presents to the clinic c/o sinus problem, that started over the weekend , c/o sinus drainage, pressure, chest congestion       Review of Systems     Review of Systems   Constitutional:  Negative for chills, fatigue and fever.   HENT:  Positive for nasal congestion and sinus pressure/congestion. Negative for ear pain, facial swelling, hearing loss, mouth dryness, mouth sores, postnasal drip, rhinorrhea and goiter.    Eyes:  Negative for discharge and itching.   Respiratory:  Positive for cough. Negative for shortness of breath and wheezing.    Cardiovascular:  Negative for chest pain and leg swelling.   Gastrointestinal:  Negative for abdominal pain, change in bowel habit and change in bowel habit.   Genitourinary:  Negative for difficulty urinating, dysuria, enuresis, frequency, hematuria and urgency.   Neurological:  Negative for dizziness, vertigo, syncope, weakness and headaches.   Psychiatric/Behavioral:  Negative for decreased concentration.       Medical / Social / Family History     Past Medical History:   Diagnosis Date    Breast cancer     Known health problems: none        Past Surgical History:   Procedure Laterality Date    ARTHROSCOPY OF KNEE Left 2021    Procedure: ARTHROSCOPY, KNEE;  Surgeon: Pino Varma MD;  Location: [facility];  Service: Orthopedics;  Laterality: Left;    BREAST BIOPSY      BREAST LUMPECTOMY      CARDIAC CATHETERIZATION    "   CHOLECYSTECTOMY      DILATION AND CURETTAGE OF UTERUS      with hysteroscopy    LUMPECTOMY,BREAST, WITH RADIOACTIVE SEED LOCALIZATION AND SENTINEL LYMPH NODE BIOPSY Left     SHOULDER ARTHROSCOPY Bilateral     TUBAL LIGATION         Social History  Ms.  reports that she has quit smoking. She has never used smokeless tobacco. She reports that she does not drink alcohol and does not use drugs.    Family History  Ms.'s family history includes Breast cancer in her daughter; Heart disease in her father; Hypertension in her mother; No Known Problems in her brother and sister.    Medications and Allergies     Medications  Outpatient Medications Marked as Taking for the 8/30/22 encounter (Office Visit) with Shana Olson NP   Medication Sig Dispense Refill    multivitamin (THERAGRAN) per tablet Take 1 tablet by mouth once daily.      vitamin D3-vitamin K2 (DOSOQUIN) 5,500-200 unit-mcg Tab Take 1 tablet by mouth once daily at 6am. 90 tablet 0       Allergies  Review of patient's allergies indicates:   Allergen Reactions    Sulfa (sulfonamide antibiotics)        Physical Examination     Vitals:    08/30/22 1016 08/30/22 1018   BP: (!) 152/80 (!) 160/82   BP Location: Right arm    Patient Position: Sitting    Pulse: 92    Resp: 18    Temp: 98.4 °F (36.9 °C)    TempSrc: Oral    SpO2: 96%    Weight: 61.8 kg (136 lb 3.2 oz)    Height: 5' 5" (1.651 m)       Physical Exam  Constitutional:       Appearance: Normal appearance.   HENT:      Head: Normocephalic.      Right Ear: Tympanic membrane, ear canal and external ear normal.      Left Ear: Tympanic membrane, ear canal and external ear normal.      Nose: Congestion present.      Comments: Mod amt clear nasal secretion, pressure over max region     Mouth/Throat:      Mouth: Mucous membranes are moist.      Pharynx: Oropharynx is clear.   Eyes:      Extraocular Movements: Extraocular movements intact.      Conjunctiva/sclera: Conjunctivae normal.      Pupils: Pupils are " equal, round, and reactive to light.   Neck:      Comments: Fili ant cervical nodes  Cardiovascular:      Rate and Rhythm: Normal rate and regular rhythm.      Pulses: Normal pulses.      Heart sounds: Normal heart sounds.   Pulmonary:      Effort: Pulmonary effort is normal.      Breath sounds: Normal breath sounds.   Abdominal:      General: Bowel sounds are normal.      Palpations: Abdomen is soft.   Musculoskeletal:         General: Normal range of motion.      Cervical back: Normal range of motion.   Lymphadenopathy:      Cervical: No cervical adenopathy.   Skin:     General: Skin is warm and dry.      Capillary Refill: Capillary refill takes less than 2 seconds.   Neurological:      General: No focal deficit present.      Mental Status: She is alert and oriented to person, place, and time.   Psychiatric:         Mood and Affect: Mood normal.         Behavior: Behavior normal.        Assessment and Plan (including Health Maintenance)      Problem List  [employer]  Document Outside HM   :    Plan:follow cbc guidelines, handout given    Cough  -     POCT SARS-COV2 (COVID) with Flu Antigen            Health Maintenance Due   Topic Date Due    Hepatitis C Screening  Never done    HIV Screening  Never done    COVID-19 Vaccine (4 - Booster for Moderna series) 03/01/2022       Problem List Items Addressed This Visit    None  Visit Diagnoses       Cough    -  Primary    Relevant Orders    POCT SARS-COV2 (COVID) with Flu Antigen (Completed)              Health Maintenance Topics with due status: Not Due       Topic Last Completion Date    Colorectal Cancer Screening 01/06/2020    Cervical Cancer Screening 12/08/2021    Mammogram 12/08/2021    Lipid Panel 06/15/2022    Influenza Vaccine Not Due       Procedures     Future Appointments   Date Time Provider Department Center   12/12/2022  9:00 AM Shana Olson NP [facility]   12/14/2022  9:30 AM [facility] [place]   12/14/2022 10:00  AM Wilner Palomino MD [facility]   6/15/2023  9:00 AM Shana Olson NP [facility]        No follow-ups on file.       Signature:  Shana Olson NP    Date of encounter: 8/30/22

## 2022-09-19 PROBLEM — Z13.220 SCREENING FOR LIPOID DISORDERS: Status: RESOLVED | Noted: 2022-06-15 | Resolved: 2022-09-19

## 2022-09-19 PROBLEM — Z13.1 ENCOUNTER FOR SCREENING FOR DIABETES MELLITUS: Status: RESOLVED | Noted: 2022-06-15 | Resolved: 2022-09-19

## 2022-12-01 ENCOUNTER — TELEPHONE (OUTPATIENT)
Dept: FAMILY MEDICINE | Facility: CLINIC | Age: 67
End: 2022-12-01
Payer: COMMERCIAL

## 2022-12-01 RX ORDER — ERGOCALCIFEROL 1.25 MG/1
50000 CAPSULE ORAL
Qty: 12 CAPSULE | Refills: 1 | Status: SHIPPED | OUTPATIENT
Start: 2022-12-01 | End: 2023-07-12

## 2022-12-01 NOTE — TELEPHONE ENCOUNTER
Pt called wanting to know what she can take other than the Vit D ordered. She can no longer get the medication at [facility].Please advise.

## 2023-01-18 ENCOUNTER — HOSPITAL ENCOUNTER (OUTPATIENT)
Dept: RADIOLOGY | Facility: HOSPITAL | Age: 68
Discharge: HOME | End: 2023-01-18
Payer: COMMERCIAL

## 2023-01-18 ENCOUNTER — OFFICE VISIT (OUTPATIENT)
Dept: OBSTETRICS AND GYNECOLOGY | Facility: CLINIC | Age: 68
End: 2023-01-18
Payer: COMMERCIAL

## 2023-01-18 VITALS
DIASTOLIC BLOOD PRESSURE: 82 MMHG | BODY MASS INDEX: 23.36 KG/M2 | HEIGHT: 65 IN | SYSTOLIC BLOOD PRESSURE: 138 MMHG | WEIGHT: 140.19 LBS

## 2023-01-18 DIAGNOSIS — Z01.419 WOMEN'S ANNUAL ROUTINE GYNECOLOGICAL EXAMINATION: Primary | ICD-10-CM

## 2023-01-18 DIAGNOSIS — Z13.820 SPECIAL SCREENING FOR OSTEOPOROSIS: ICD-10-CM

## 2023-01-18 DIAGNOSIS — Z12.31 OTHER SCREENING MAMMOGRAM: ICD-10-CM

## 2023-01-18 PROCEDURE — 3008F BODY MASS INDEX DOCD: CPT | Mod: ,,, | Performed by: OBSTETRICS & GYNECOLOGY

## 2023-01-18 PROCEDURE — 99402 PR PREVENT COUNSEL,INDIV,30 MIN: ICD-10-PCS | Mod: S$PBB,,, | Performed by: OBSTETRICS & GYNECOLOGY

## 2023-01-18 PROCEDURE — 99402 PREV MED CNSL INDIV APPRX 30: CPT | Mod: S$PBB,,, | Performed by: OBSTETRICS & GYNECOLOGY

## 2023-01-18 PROCEDURE — 77067 SCR MAMMO BI INCL CAD: CPT | Mod: TC

## 2023-01-18 PROCEDURE — 3079F DIAST BP 80-89 MM HG: CPT | Mod: ,,, | Performed by: OBSTETRICS & GYNECOLOGY

## 2023-01-18 PROCEDURE — 3075F SYST BP GE 130 - 139MM HG: CPT | Mod: ,,, | Performed by: OBSTETRICS & GYNECOLOGY

## 2023-01-18 PROCEDURE — 1159F PR MEDICATION LIST DOCUMENTED IN MEDICAL RECORD: ICD-10-PCS | Mod: ,,, | Performed by: OBSTETRICS & GYNECOLOGY

## 2023-01-18 PROCEDURE — 1159F MED LIST DOCD IN RCRD: CPT | Mod: ,,, | Performed by: OBSTETRICS & GYNECOLOGY

## 2023-01-18 PROCEDURE — 99213 OFFICE O/P EST LOW 20 MIN: CPT | Mod: PBBFAC | Performed by: OBSTETRICS & GYNECOLOGY

## 2023-01-18 PROCEDURE — 3075F PR MOST RECENT SYSTOLIC BLOOD PRESS GE 130-139MM HG: ICD-10-PCS | Mod: ,,, | Performed by: OBSTETRICS & GYNECOLOGY

## 2023-01-18 PROCEDURE — 3079F PR MOST RECENT DIASTOLIC BLOOD PRESSURE 80-89 MM HG: ICD-10-PCS | Mod: ,,, | Performed by: OBSTETRICS & GYNECOLOGY

## 2023-01-18 PROCEDURE — 3008F PR BODY MASS INDEX (BMI) DOCUMENTED: ICD-10-PCS | Mod: ,,, | Performed by: OBSTETRICS & GYNECOLOGY

## 2023-01-18 NOTE — PROGRESS NOTES
Subjective:       Patient ID: Chaya Walls is a 67 y.o. female.    Chief Complaint: Gynecologic Exam (Last seen in Dec. 2021, Here for Gyn Exam)    Presents for yearly gyn check.      No specific gyn complaints.      Patient has lost her  within the last week with  this week.  She is coping as well as expected.        Review of Systems      Objective:      Physical Exam  Exam conducted with a chaperone present.   HENT:      Head: Normocephalic.      Nose: Nose normal.   Eyes:      Pupils: Pupils are equal, round, and reactive to light.   Cardiovascular:      Rate and Rhythm: Normal rate.   Pulmonary:      Effort: Pulmonary effort is normal.      Breath sounds: Normal breath sounds.   Chest:      Comments: Breasts without palpable masses she has had a left breast cancer.  Scar noted in left lateral quadrant well-healed.  No other abnormality noted.  She had mammography screening today.      She is been followed by Dr. Sibley but has been released since she is now greater than 10 years out from breast cancer.  She will continue yearly screening mammography notify me if she is not heard from results within 2 weeks  Abdominal:      General: Abdomen is flat.      Palpations: Abdomen is soft.   Genitourinary:     General: Normal vulva.      Vagina: Normal.      Cervix: Normal.      Uterus: Normal.       Adnexa: Right adnexa normal and left adnexa normal.      Rectum: Normal.      Comments: External normal vault normal cervix normal to appearance previous Pap - with negative HPV therefore Pap not repeated today bimanual exam including rectovaginal examination revealed uterus normal size ovaries are nonpalpable on vaginal or rectovaginal examination hemoccult was negative colonoscopy screening .   Musculoskeletal:         General: Normal range of motion.      Cervical back: Full passive range of motion without pain, normal range of motion and neck supple.      Comments: Discussed scheduling DEXA scan    Skin:     General: Skin is dry.   Neurological:      General: No focal deficit present.      Mental Status: She is alert.   Psychiatric:         Attention and Perception: Attention normal.         Mood and Affect: Mood normal.       Assessment:       1. Women's annual routine gynecological examination    2. Special screening for osteoporosis        Plan:       Patient Instructions   Discussed normal gyn examination.      Discussed scheduling bone density scan.      Continue calcium with vitamin-D supplements.      Continue yearly screening mammography notify me if she is not heard from results     Continue colonoscopy screening as directed by Gastroenterology    Continue routine glucose cholesterol testing with primary care provider.

## 2023-01-18 NOTE — PATIENT INSTRUCTIONS
Discussed normal gyn examination.      Discussed scheduling bone density scan.      Continue calcium with vitamin-D supplements.      Continue yearly screening mammography notify me if she is not heard from results     Continue colonoscopy screening as directed by Gastroenterology    Continue routine glucose cholesterol testing with primary care provider.

## 2023-01-30 ENCOUNTER — OFFICE VISIT (OUTPATIENT)
Dept: FAMILY MEDICINE | Facility: CLINIC | Age: 68
End: 2023-01-30
Payer: COMMERCIAL

## 2023-01-30 VITALS
TEMPERATURE: 99 F | HEIGHT: 65 IN | BODY MASS INDEX: 23.57 KG/M2 | RESPIRATION RATE: 20 BRPM | WEIGHT: 141.5 LBS | DIASTOLIC BLOOD PRESSURE: 64 MMHG | HEART RATE: 107 BPM | OXYGEN SATURATION: 97 % | SYSTOLIC BLOOD PRESSURE: 130 MMHG

## 2023-01-30 DIAGNOSIS — R52 BODY ACHES: ICD-10-CM

## 2023-01-30 DIAGNOSIS — R09.89 CHEST CONGESTION: Primary | ICD-10-CM

## 2023-01-30 DIAGNOSIS — R05.1 ACUTE COUGH: ICD-10-CM

## 2023-01-30 DIAGNOSIS — J40 BRONCHITIS: ICD-10-CM

## 2023-01-30 DIAGNOSIS — J01.00 ACUTE NON-RECURRENT MAXILLARY SINUSITIS: ICD-10-CM

## 2023-01-30 LAB
CTP QC/QA: YES
CTP QC/QA: YES
FLUAV AG NPH QL: NEGATIVE
FLUBV AG NPH QL: NEGATIVE
SARS-COV-2 AG RESP QL IA.RAPID: NEGATIVE

## 2023-01-30 PROCEDURE — 3078F DIAST BP <80 MM HG: CPT | Mod: ,,, | Performed by: NURSE PRACTITIONER

## 2023-01-30 PROCEDURE — 1101F PT FALLS ASSESS-DOCD LE1/YR: CPT | Mod: ,,, | Performed by: NURSE PRACTITIONER

## 2023-01-30 PROCEDURE — 3078F PR MOST RECENT DIASTOLIC BLOOD PRESSURE < 80 MM HG: ICD-10-PCS | Mod: ,,, | Performed by: NURSE PRACTITIONER

## 2023-01-30 PROCEDURE — 99214 OFFICE O/P EST MOD 30 MIN: CPT | Mod: 25,,, | Performed by: NURSE PRACTITIONER

## 2023-01-30 PROCEDURE — 1159F PR MEDICATION LIST DOCUMENTED IN MEDICAL RECORD: ICD-10-PCS | Mod: ,,, | Performed by: NURSE PRACTITIONER

## 2023-01-30 PROCEDURE — 1126F AMNT PAIN NOTED NONE PRSNT: CPT | Mod: ,,, | Performed by: NURSE PRACTITIONER

## 2023-01-30 PROCEDURE — 3008F BODY MASS INDEX DOCD: CPT | Mod: ,,, | Performed by: NURSE PRACTITIONER

## 2023-01-30 PROCEDURE — 3288F FALL RISK ASSESSMENT DOCD: CPT | Mod: ,,, | Performed by: NURSE PRACTITIONER

## 2023-01-30 PROCEDURE — 99214 PR OFFICE/OUTPT VISIT, EST, LEVL IV, 30-39 MIN: ICD-10-PCS | Mod: 25,,, | Performed by: NURSE PRACTITIONER

## 2023-01-30 PROCEDURE — 87804 POCT INFLUENZA A/B: ICD-10-PCS | Mod: QW,,, | Performed by: NURSE PRACTITIONER

## 2023-01-30 PROCEDURE — 87804 INFLUENZA ASSAY W/OPTIC: CPT | Mod: QW,,, | Performed by: NURSE PRACTITIONER

## 2023-01-30 PROCEDURE — 3008F PR BODY MASS INDEX (BMI) DOCUMENTED: ICD-10-PCS | Mod: ,,, | Performed by: NURSE PRACTITIONER

## 2023-01-30 PROCEDURE — 3075F SYST BP GE 130 - 139MM HG: CPT | Mod: ,,, | Performed by: NURSE PRACTITIONER

## 2023-01-30 PROCEDURE — 1159F MED LIST DOCD IN RCRD: CPT | Mod: ,,, | Performed by: NURSE PRACTITIONER

## 2023-01-30 PROCEDURE — 96372 THER/PROPH/DIAG INJ SC/IM: CPT | Mod: ,,, | Performed by: NURSE PRACTITIONER

## 2023-01-30 PROCEDURE — 87426 SARS CORONAVIRUS 2 ANTIGEN POCT: ICD-10-PCS | Mod: QW,,, | Performed by: NURSE PRACTITIONER

## 2023-01-30 PROCEDURE — 1101F PR PT FALLS ASSESS DOC 0-1 FALLS W/OUT INJ PAST YR: ICD-10-PCS | Mod: ,,, | Performed by: NURSE PRACTITIONER

## 2023-01-30 PROCEDURE — 3075F PR MOST RECENT SYSTOLIC BLOOD PRESS GE 130-139MM HG: ICD-10-PCS | Mod: ,,, | Performed by: NURSE PRACTITIONER

## 2023-01-30 PROCEDURE — 87426 SARSCOV CORONAVIRUS AG IA: CPT | Mod: QW,,, | Performed by: NURSE PRACTITIONER

## 2023-01-30 PROCEDURE — 3288F PR FALLS RISK ASSESSMENT DOCUMENTED: ICD-10-PCS | Mod: ,,, | Performed by: NURSE PRACTITIONER

## 2023-01-30 PROCEDURE — 96372 PR INJECTION,THERAP/PROPH/DIAG2ST, IM OR SUBCUT: ICD-10-PCS | Mod: ,,, | Performed by: NURSE PRACTITIONER

## 2023-01-30 PROCEDURE — 1126F PR PAIN SEVERITY QUANTIFIED, NO PAIN PRESENT: ICD-10-PCS | Mod: ,,, | Performed by: NURSE PRACTITIONER

## 2023-01-30 RX ORDER — DOXYCYCLINE 100 MG/1
100 CAPSULE ORAL EVERY 12 HOURS
Qty: 20 CAPSULE | Refills: 0 | Status: SHIPPED | OUTPATIENT
Start: 2023-01-30 | End: 2023-02-13

## 2023-01-30 RX ORDER — DEXAMETHASONE SODIUM PHOSPHATE 4 MG/ML
4 INJECTION, SOLUTION INTRA-ARTICULAR; INTRALESIONAL; INTRAMUSCULAR; INTRAVENOUS; SOFT TISSUE
Status: COMPLETED | OUTPATIENT
Start: 2023-01-30 | End: 2023-01-30

## 2023-01-30 RX ORDER — FLUTICASONE PROPIONATE 50 MCG
1 SPRAY, SUSPENSION (ML) NASAL DAILY
Qty: 15.8 ML | Refills: 0 | Status: SHIPPED | OUTPATIENT
Start: 2023-01-30 | End: 2023-10-04

## 2023-01-30 RX ORDER — CEFTRIAXONE 1 G/1
1 INJECTION, POWDER, FOR SOLUTION INTRAMUSCULAR; INTRAVENOUS
Status: COMPLETED | OUTPATIENT
Start: 2023-01-30 | End: 2023-01-30

## 2023-01-30 RX ADMIN — CEFTRIAXONE 1 G: 1 INJECTION, POWDER, FOR SOLUTION INTRAMUSCULAR; INTRAVENOUS at 10:01

## 2023-01-30 RX ADMIN — DEXAMETHASONE SODIUM PHOSPHATE 4 MG: 4 INJECTION, SOLUTION INTRA-ARTICULAR; INTRALESIONAL; INTRAMUSCULAR; INTRAVENOUS; SOFT TISSUE at 10:01

## 2023-02-13 ENCOUNTER — OFFICE VISIT (OUTPATIENT)
Dept: FAMILY MEDICINE | Facility: CLINIC | Age: 68
End: 2023-02-13
Payer: COMMERCIAL

## 2023-02-13 ENCOUNTER — APPOINTMENT (OUTPATIENT)
Dept: RADIOLOGY | Facility: CLINIC | Age: 68
End: 2023-02-13
Attending: NURSE PRACTITIONER
Payer: MEDICARE

## 2023-02-13 VITALS
WEIGHT: 140.5 LBS | RESPIRATION RATE: 20 BRPM | TEMPERATURE: 98 F | SYSTOLIC BLOOD PRESSURE: 128 MMHG | HEART RATE: 85 BPM | OXYGEN SATURATION: 97 % | BODY MASS INDEX: 23.41 KG/M2 | HEIGHT: 65 IN | DIASTOLIC BLOOD PRESSURE: 80 MMHG

## 2023-02-13 DIAGNOSIS — R09.89 CHEST CONGESTION: ICD-10-CM

## 2023-02-13 DIAGNOSIS — J18.9 PNEUMONIA OF LEFT UPPER LOBE DUE TO INFECTIOUS ORGANISM: Primary | ICD-10-CM

## 2023-02-13 PROBLEM — M25.562 ACUTE PAIN OF LEFT KNEE: Status: RESOLVED | Noted: 2021-06-02 | Resolved: 2023-02-13

## 2023-02-13 PROBLEM — J06.9 UPPER RESPIRATORY TRACT INFECTION: Status: RESOLVED | Noted: 2021-11-16 | Resolved: 2023-02-13

## 2023-02-13 PROBLEM — R05.9 COUGH: Status: RESOLVED | Noted: 2022-08-30 | Resolved: 2023-02-13

## 2023-02-13 PROBLEM — U07.1 COVID: Status: RESOLVED | Noted: 2022-08-30 | Resolved: 2023-02-13

## 2023-02-13 PROBLEM — R50.9 FEVER: Status: RESOLVED | Noted: 2021-11-16 | Resolved: 2023-02-13

## 2023-02-13 PROCEDURE — 99214 OFFICE O/P EST MOD 30 MIN: CPT | Mod: ,,, | Performed by: NURSE PRACTITIONER

## 2023-02-13 PROCEDURE — 71046 XR CHEST PA AND LATERAL: ICD-10-PCS | Mod: 26,,, | Performed by: RADIOLOGY

## 2023-02-13 PROCEDURE — 71046 X-RAY EXAM CHEST 2 VIEWS: CPT | Mod: 26,,, | Performed by: RADIOLOGY

## 2023-02-13 PROCEDURE — 99214 PR OFFICE/OUTPT VISIT, EST, LEVL IV, 30-39 MIN: ICD-10-PCS | Mod: ,,, | Performed by: NURSE PRACTITIONER

## 2023-02-13 PROCEDURE — 71046 X-RAY EXAM CHEST 2 VIEWS: CPT | Mod: TC,RHCUB | Performed by: NURSE PRACTITIONER

## 2023-02-13 RX ORDER — AZITHROMYCIN 250 MG/1
TABLET, FILM COATED ORAL
Qty: 6 TABLET | Refills: 0 | Status: SHIPPED | OUTPATIENT
Start: 2023-02-13 | End: 2023-02-18

## 2023-02-13 RX ORDER — CHLORPHENIRAMINE MALEATE, DEXTROMETHORPHAN HYDROBROMIDE, AND PHENYLEPHRINE HYDROCHLORIDE 4; 10; 10 MG/1; MG/1; MG/1
1 TABLET, COATED ORAL EVERY 6 HOURS PRN
COMMUNITY
Start: 2023-01-30 | End: 2023-10-04

## 2023-02-13 NOTE — PROGRESS NOTES
Shana Olson NP   [facility]  [address]  [place]     PATIENT NAME: Chaya Walls  : 1955  DATE: 23  MRN: [id number]      Billing Provider: Shana Olson NP  Level of Service:   Patient PCP Information       Provider PCP Type    Shana Olson NP General            Reason for Visit / Chief Complaint: Follow-up (F/U pneumonia.  Pt states she is doing better.)       Update PCP  Update Chief Complaint         History of Present Illness / Problem Focused Workflow     Chaya Walls presents to the clinic here for eval of  pneumoinia in left upper lobe, pt denies any problems, states she is feeling good      Review of Systems     Review of Systems   Constitutional:  Negative for chills, fatigue and fever.   HENT:  Negative for nasal congestion, ear pain, facial swelling, hearing loss, mouth dryness, mouth sores, postnasal drip, rhinorrhea, sinus pressure/congestion and goiter.    Eyes:  Negative for discharge and itching.   Respiratory:  Negative for cough, shortness of breath and wheezing.    Cardiovascular:  Negative for chest pain and leg swelling.   Gastrointestinal:  Negative for abdominal pain, change in bowel habit and change in bowel habit.   Genitourinary:  Negative for difficulty urinating, dysuria, enuresis, frequency, hematuria and urgency.   Neurological:  Negative for dizziness, vertigo, syncope, weakness and headaches.   Psychiatric/Behavioral:  Negative for decreased concentration.    All other systems reviewed and are negative.     Medical / Social / Family History     Past Medical History:   Diagnosis Date    Breast cancer     Known health problems: none     Pneumonia of left upper lobe due to infectious organism 2023       Past Surgical History:   Procedure Laterality Date    ARTHROSCOPY OF KNEE Left 2021    Procedure: ARTHROSCOPY, KNEE;  Surgeon: Pino Varma MD;  Location: [facility];  Service: Orthopedics;  Laterality: Left;    BREAST BIOPSY    "   BREAST LUMPECTOMY      CARDIAC CATHETERIZATION      CHOLECYSTECTOMY      DILATION AND CURETTAGE OF UTERUS      with hysteroscopy    LUMPECTOMY,BREAST, WITH RADIOACTIVE SEED LOCALIZATION AND SENTINEL LYMPH NODE BIOPSY Left     SHOULDER ARTHROSCOPY Bilateral     TUBAL LIGATION         Social History  Ms.  reports that she has quit smoking. She has never used smokeless tobacco. She reports that she does not drink alcohol and does not use drugs.    Family History  Ms.'s family history includes Breast cancer in her daughter; Heart disease in her father; Hypertension in her mother; No Known Problems in her brother and sister.    Medications and Allergies     Medications  Outpatient Medications Marked as Taking for the 2/13/23 encounter (Office Visit) with Shana Olson NP   Medication Sig Dispense Refill    ED A-HIST DM 4-10-10 mg Tab Take 1 tablet by mouth every 6 (six) hours as needed.      ergocalciferol (ERGOCALCIFEROL) 50,000 unit Cap Take 1 capsule (50,000 Units total) by mouth every 7 days. 12 capsule 1    fluticasone propionate (FLONASE) 50 mcg/actuation nasal spray 1 spray (50 mcg total) by Each Nostril route once daily. 15.8 mL 0    multivitamin (THERAGRAN) per tablet Take 1 tablet by mouth once daily.         Allergies  Review of patient's allergies indicates:   Allergen Reactions    Sulfa (sulfonamide antibiotics)        Physical Examination     Vitals:    02/13/23 0946   BP: 128/80   BP Location: Left arm   Patient Position: Sitting   BP Method: Medium (Manual)   Pulse: 85   Resp: 20   Temp: 97.9 °F (36.6 °C)   TempSrc: Oral   SpO2: 97%   Weight: 63.7 kg (140 lb 8 oz)   Height: 5' 5" (1.651 m)      Physical Exam  Vitals and nursing note reviewed.   Constitutional:       Appearance: Normal appearance.   HENT:      Head: Normocephalic.      Right Ear: Tympanic membrane, ear canal and external ear normal.      Left Ear: Tympanic membrane, ear canal and external ear normal.      Nose: Nose normal.      " Mouth/Throat:      Mouth: Mucous membranes are moist.      Pharynx: Oropharynx is clear.   Eyes:      Extraocular Movements: Extraocular movements intact.      Conjunctiva/sclera: Conjunctivae normal.      Pupils: Pupils are equal, round, and reactive to light.   Cardiovascular:      Rate and Rhythm: Normal rate and regular rhythm.      Pulses: Normal pulses.      Heart sounds: Normal heart sounds.   Pulmonary:      Effort: Pulmonary effort is normal.      Breath sounds: Normal breath sounds.   Abdominal:      General: Bowel sounds are normal.      Palpations: Abdomen is soft.   Musculoskeletal:         General: Normal range of motion.   Skin:     General: Skin is warm and dry.      Capillary Refill: Capillary refill takes less than 2 seconds.   Neurological:      General: No focal deficit present.      Mental Status: She is alert and oriented to person, place, and time.   Psychiatric:         Mood and Affect: Mood normal.         Behavior: Behavior normal.        Assessment and Plan (including Health Maintenance)      Problem List  Smart Sets  Document Outside HM   :    Plan: cont all meds as ordered, return to clinic as scheudled    Pneumonia of left upper lobe due to infectious organism    Chest congestion  -     X-Ray Chest PA And Lateral; Future; Expected date: 02/13/2023            Health Maintenance Due   Topic Date Due    Hepatitis C Screening  Never done    Pneumococcal Vaccines (Age 65+) (1 - PCV) Never done    HIV Screening  Never done    DEXA Scan  Never done    COVID-19 Vaccine (4 - Booster for Moderna series) 12/27/2021    Influenza Vaccine (1) Never done       Problem List Items Addressed This Visit          Pulmonary    RESOLVED: Pneumonia of left upper lobe due to infectious organism - Primary     Other Visit Diagnoses       Chest congestion        Relevant Orders    X-Ray Chest PA And Lateral              Health Maintenance Topics with due status: Not Due       Topic Last Completion Date     Colorectal Cancer Screening 01/06/2020    Lipid Panel 06/15/2022    Mammogram 01/18/2023       Procedures     Future Appointments   Date Time Provider Department Center   2/14/2023  9:00 AM [facility] DEXA1 [facility] [place]   6/15/2023  9:00 AM Shana Olson NP [facility] [place]   1/24/2024  9:15 AM [facility] MAMMO1 [facility] [place]        No follow-ups on file.       Signature:  Shana Olson NP    Date of encounter: 2/13/23

## 2023-02-13 NOTE — PROGRESS NOTES
Notyf that dr dao says that she now has some pneumonia in the right upper lobe, send in zpack for pt to take and repeat xray in 2 weeks

## 2023-02-14 ENCOUNTER — HOSPITAL ENCOUNTER (OUTPATIENT)
Dept: RADIOLOGY | Facility: HOSPITAL | Age: 68
Discharge: HOME | End: 2023-02-14
Attending: OBSTETRICS & GYNECOLOGY
Payer: COMMERCIAL

## 2023-02-14 DIAGNOSIS — Z13.820 SPECIAL SCREENING FOR OSTEOPOROSIS: ICD-10-CM

## 2023-02-14 PROCEDURE — 77080 DXA BONE DENSITY AXIAL: CPT | Mod: TC

## 2023-02-14 PROCEDURE — 77080 DEXA BONE DENSITY SPINE HIP: ICD-10-PCS | Mod: 26,,, | Performed by: RADIOLOGY

## 2023-02-14 PROCEDURE — 77080 DXA BONE DENSITY AXIAL: CPT | Mod: 26,,, | Performed by: RADIOLOGY

## 2023-02-14 NOTE — PROGRESS NOTES
Pt has osteoporosis,needs to start evenity monthly x 1 year and follow recommendations from dr maki,

## 2023-02-28 ENCOUNTER — OFFICE VISIT (OUTPATIENT)
Dept: FAMILY MEDICINE | Facility: CLINIC | Age: 68
End: 2023-02-28
Payer: COMMERCIAL

## 2023-02-28 ENCOUNTER — HOSPITAL ENCOUNTER (OUTPATIENT)
Dept: RADIOLOGY | Facility: HOSPITAL | Age: 68
Discharge: HOME | End: 2023-02-28
Attending: NURSE PRACTITIONER
Payer: COMMERCIAL

## 2023-02-28 VITALS
HEIGHT: 65 IN | TEMPERATURE: 98 F | HEART RATE: 85 BPM | SYSTOLIC BLOOD PRESSURE: 110 MMHG | RESPIRATION RATE: 20 BRPM | WEIGHT: 142.13 LBS | DIASTOLIC BLOOD PRESSURE: 70 MMHG | BODY MASS INDEX: 23.68 KG/M2 | OXYGEN SATURATION: 95 %

## 2023-02-28 DIAGNOSIS — J18.9 PNEUMONIA OF LEFT UPPER LOBE DUE TO INFECTIOUS ORGANISM: ICD-10-CM

## 2023-02-28 DIAGNOSIS — J18.9 PNEUMONIA OF LEFT UPPER LOBE DUE TO INFECTIOUS ORGANISM: Primary | ICD-10-CM

## 2023-02-28 PROBLEM — S83.242A ACUTE MEDIAL MENISCUS TEAR OF LEFT KNEE: Status: RESOLVED | Noted: 2021-06-21 | Resolved: 2023-02-28

## 2023-02-28 PROBLEM — R53.83 FATIGUE: Status: RESOLVED | Noted: 2022-06-15 | Resolved: 2023-02-28

## 2023-02-28 PROCEDURE — 3008F PR BODY MASS INDEX (BMI) DOCUMENTED: ICD-10-PCS | Mod: ,,, | Performed by: NURSE PRACTITIONER

## 2023-02-28 PROCEDURE — 3288F PR FALLS RISK ASSESSMENT DOCUMENTED: ICD-10-PCS | Mod: ,,, | Performed by: NURSE PRACTITIONER

## 2023-02-28 PROCEDURE — 1159F MED LIST DOCD IN RCRD: CPT | Mod: ,,, | Performed by: NURSE PRACTITIONER

## 2023-02-28 PROCEDURE — 3078F DIAST BP <80 MM HG: CPT | Mod: ,,, | Performed by: NURSE PRACTITIONER

## 2023-02-28 PROCEDURE — 71046 X-RAY EXAM CHEST 2 VIEWS: CPT | Mod: TC

## 2023-02-28 PROCEDURE — 99213 OFFICE O/P EST LOW 20 MIN: CPT | Mod: ,,, | Performed by: NURSE PRACTITIONER

## 2023-02-28 PROCEDURE — 1159F PR MEDICATION LIST DOCUMENTED IN MEDICAL RECORD: ICD-10-PCS | Mod: ,,, | Performed by: NURSE PRACTITIONER

## 2023-02-28 PROCEDURE — 1101F PR PT FALLS ASSESS DOC 0-1 FALLS W/OUT INJ PAST YR: ICD-10-PCS | Mod: ,,, | Performed by: NURSE PRACTITIONER

## 2023-02-28 PROCEDURE — 3008F BODY MASS INDEX DOCD: CPT | Mod: ,,, | Performed by: NURSE PRACTITIONER

## 2023-02-28 PROCEDURE — 1126F AMNT PAIN NOTED NONE PRSNT: CPT | Mod: ,,, | Performed by: NURSE PRACTITIONER

## 2023-02-28 PROCEDURE — 3074F PR MOST RECENT SYSTOLIC BLOOD PRESSURE < 130 MM HG: ICD-10-PCS | Mod: ,,, | Performed by: NURSE PRACTITIONER

## 2023-02-28 PROCEDURE — 99213 PR OFFICE/OUTPT VISIT, EST, LEVL III, 20-29 MIN: ICD-10-PCS | Mod: ,,, | Performed by: NURSE PRACTITIONER

## 2023-02-28 PROCEDURE — 3074F SYST BP LT 130 MM HG: CPT | Mod: ,,, | Performed by: NURSE PRACTITIONER

## 2023-02-28 PROCEDURE — 3288F FALL RISK ASSESSMENT DOCD: CPT | Mod: ,,, | Performed by: NURSE PRACTITIONER

## 2023-02-28 PROCEDURE — 1101F PT FALLS ASSESS-DOCD LE1/YR: CPT | Mod: ,,, | Performed by: NURSE PRACTITIONER

## 2023-02-28 PROCEDURE — 3078F PR MOST RECENT DIASTOLIC BLOOD PRESSURE < 80 MM HG: ICD-10-PCS | Mod: ,,, | Performed by: NURSE PRACTITIONER

## 2023-02-28 PROCEDURE — 1126F PR PAIN SEVERITY QUANTIFIED, NO PAIN PRESENT: ICD-10-PCS | Mod: ,,, | Performed by: NURSE PRACTITIONER

## 2023-02-28 NOTE — PROGRESS NOTES
Shana Olson NP   [facility]  [address]  [place]     PATIENT NAME: Chaya Walls  : 1955  DATE: 23  MRN: [id number]      Billing Provider: Shana Olson NP  Level of Service:   Patient PCP Information       Provider PCP Type    Shana Olsno NP General            Reason for Visit / Chief Complaint: Follow-up (2 wk f/u pneumonia)       Update PCP  Update Chief Complaint         History of Present Illness / Problem Focused Workflow     Chaya Walls presents to the clinic here for eval of pneumoinia      Review of Systems     Review of Systems   Constitutional:  Negative for chills, fatigue and fever.   HENT:  Negative for nasal congestion, ear pain, facial swelling, hearing loss, mouth dryness, mouth sores, postnasal drip, rhinorrhea, sinus pressure/congestion and goiter.    Eyes:  Negative for discharge and itching.   Respiratory:  Negative for cough, shortness of breath and wheezing.    Cardiovascular:  Negative for chest pain and leg swelling.   Gastrointestinal:  Negative for abdominal pain, change in bowel habit and change in bowel habit.   Genitourinary:  Negative for difficulty urinating, dysuria, enuresis, frequency, hematuria and urgency.   Neurological:  Negative for dizziness, vertigo, syncope, weakness and headaches.   Psychiatric/Behavioral:  Negative for decreased concentration.    All other systems reviewed and are negative.     Medical / Social / Family History     Past Medical History:   Diagnosis Date    Breast cancer     Known health problems: none     Pneumonia of left upper lobe due to infectious organism 2023       Past Surgical History:   Procedure Laterality Date    ARTHROSCOPY OF KNEE Left 2021    Procedure: ARTHROSCOPY, KNEE;  Surgeon: Pino Varma MD;  Location: [facility];  Service: Orthopedics;  Laterality: Left;    BREAST BIOPSY      BREAST LUMPECTOMY      CARDIAC CATHETERIZATION      CHOLECYSTECTOMY      DILATION AND CURETTAGE  "OF UTERUS      with hysteroscopy    LUMPECTOMY,BREAST, WITH RADIOACTIVE SEED LOCALIZATION AND SENTINEL LYMPH NODE BIOPSY Left     SHOULDER ARTHROSCOPY Bilateral     TUBAL LIGATION         Social History  Ms.  reports that she has quit smoking. She has never used smokeless tobacco. She reports that she does not drink alcohol and does not use drugs.    Family History  Ms.'s family history includes Breast cancer in her daughter; Heart disease in her father; Hypertension in her mother; No Known Problems in her brother and sister.    Medications and Allergies     Medications  Outpatient Medications Marked as Taking for the 2/28/23 encounter (Office Visit) with Shana Olson NP   Medication Sig Dispense Refill    ED A-HIST DM 4-10-10 mg Tab Take 1 tablet by mouth every 6 (six) hours as needed.      ergocalciferol (ERGOCALCIFEROL) 50,000 unit Cap Take 1 capsule (50,000 Units total) by mouth every 7 days. 12 capsule 1    fluticasone propionate (FLONASE) 50 mcg/actuation nasal spray 1 spray (50 mcg total) by Each Nostril route once daily. 15.8 mL 0    multivitamin (THERAGRAN) per tablet Take 1 tablet by mouth once daily.         Allergies  Review of patient's allergies indicates:   Allergen Reactions    Sulfa (sulfonamide antibiotics)        Physical Examination     Vitals:    02/28/23 1325   BP: 110/70   BP Location: Right arm   Patient Position: Sitting   BP Method: Medium (Manual)   Pulse: 85   Resp: 20   Temp: 98.1 °F (36.7 °C)   TempSrc: Oral   SpO2: 95%   Weight: 64.5 kg (142 lb 2 oz)   Height: 5' 5" (1.651 m)      Physical Exam  Vitals and nursing note reviewed.   Constitutional:       Appearance: Normal appearance.   HENT:      Head: Normocephalic.      Right Ear: Tympanic membrane, ear canal and external ear normal.      Left Ear: Tympanic membrane, ear canal and external ear normal.      Nose: Nose normal.      Mouth/Throat:      Mouth: Mucous membranes are moist.      Pharynx: Oropharynx is clear.   Eyes:      " Extraocular Movements: Extraocular movements intact.      Conjunctiva/sclera: Conjunctivae normal.      Pupils: Pupils are equal, round, and reactive to light.   Cardiovascular:      Rate and Rhythm: Normal rate and regular rhythm.      Pulses: Normal pulses.      Heart sounds: Normal heart sounds.   Pulmonary:      Effort: Pulmonary effort is normal.      Breath sounds: Normal breath sounds.   Abdominal:      General: Bowel sounds are normal.      Palpations: Abdomen is soft.   Musculoskeletal:         General: Normal range of motion.   Skin:     General: Skin is warm and dry.      Capillary Refill: Capillary refill takes less than 2 seconds.   Neurological:      General: No focal deficit present.      Mental Status: She is alert and oriented to person, place, and time.   Psychiatric:         Mood and Affect: Mood normal.         Behavior: Behavior normal.        Assessment and Plan (including Health Maintenance)      Problem List  Smart Sets  Document Outside HM   :    Plan:     Pneumonia of left upper lobe due to infectious organism  -     X-Ray Chest PA And Lateral; Future; Expected date: 02/28/2023            Health Maintenance Due   Topic Date Due    Hepatitis C Screening  Never done    Pneumococcal Vaccines (Age 65+) (1 - PCV) Never done    HIV Screening  Never done    COVID-19 Vaccine (4 - Booster for Moderna series) 12/27/2021    Influenza Vaccine (1) Never done       Problem List Items Addressed This Visit    None  Visit Diagnoses       Pneumonia of left upper lobe due to infectious organism    -  Primary    Relevant Orders    X-Ray Chest PA And Lateral              Health Maintenance Topics with due status: Not Due       Topic Last Completion Date    Colorectal Cancer Screening 01/06/2020    Lipid Panel 06/15/2022    Mammogram 01/18/2023    DEXA Scan 02/14/2023       Procedures     Future Appointments   Date Time Provider Department Center   3/21/2023  2:15 PM Wilner Palomino MD [facility]    6/15/2023  9:00 [facility] [place]   1/24/2024  9:15 AM [facility] [place]        No follow-ups on file.       Signature:  Shana Olson NP    Date of encounter: 2/28/23

## 2023-03-21 ENCOUNTER — OFFICE VISIT (OUTPATIENT)
Dept: OBSTETRICS AND GYNECOLOGY | Facility: CLINIC | Age: 68
End: 2023-03-21
Payer: COMMERCIAL

## 2023-03-21 VITALS — DIASTOLIC BLOOD PRESSURE: 74 MMHG | SYSTOLIC BLOOD PRESSURE: 140 MMHG

## 2023-03-21 DIAGNOSIS — M81.0 OSTEOPOROSIS, UNSPECIFIED OSTEOPOROSIS TYPE, UNSPECIFIED PATHOLOGICAL FRACTURE PRESENCE: Primary | ICD-10-CM

## 2023-03-21 LAB
CALCIUM UR-MCNC: 9 MG/DL
CALCIUM/CREATININE RATIO, URINE: 0.09 %
CREAT UR-MCNC: 103 MG/DL (ref 28–219)

## 2023-03-21 PROCEDURE — 82340 ASSAY OF CALCIUM IN URINE: CPT | Mod: ,,, | Performed by: CLINICAL MEDICAL LABORATORY

## 2023-03-21 PROCEDURE — 99212 PR OFFICE/OUTPT VISIT, EST, LEVL II, 10-19 MIN: ICD-10-PCS | Mod: S$PBB,,, | Performed by: OBSTETRICS & GYNECOLOGY

## 2023-03-21 PROCEDURE — 99212 OFFICE O/P EST SF 10 MIN: CPT | Mod: S$PBB,,, | Performed by: OBSTETRICS & GYNECOLOGY

## 2023-03-21 PROCEDURE — 82570 ASSAY OF URINE CREATININE: CPT | Mod: ,,, | Performed by: CLINICAL MEDICAL LABORATORY

## 2023-03-21 PROCEDURE — 82340 CALCIUM/CREATININE RATIO,URINE RANDOM: ICD-10-PCS | Mod: ,,, | Performed by: CLINICAL MEDICAL LABORATORY

## 2023-03-21 PROCEDURE — 3077F PR MOST RECENT SYSTOLIC BLOOD PRESSURE >= 140 MM HG: ICD-10-PCS | Mod: ,,, | Performed by: OBSTETRICS & GYNECOLOGY

## 2023-03-21 PROCEDURE — 3078F DIAST BP <80 MM HG: CPT | Mod: ,,, | Performed by: OBSTETRICS & GYNECOLOGY

## 2023-03-21 PROCEDURE — 1159F PR MEDICATION LIST DOCUMENTED IN MEDICAL RECORD: ICD-10-PCS | Mod: ,,, | Performed by: OBSTETRICS & GYNECOLOGY

## 2023-03-21 PROCEDURE — 99213 OFFICE O/P EST LOW 20 MIN: CPT | Mod: PBBFAC | Performed by: OBSTETRICS & GYNECOLOGY

## 2023-03-21 PROCEDURE — 3077F SYST BP >= 140 MM HG: CPT | Mod: ,,, | Performed by: OBSTETRICS & GYNECOLOGY

## 2023-03-21 PROCEDURE — 82570 CALCIUM/CREATININE RATIO,URINE RANDOM: ICD-10-PCS | Mod: ,,, | Performed by: CLINICAL MEDICAL LABORATORY

## 2023-03-21 PROCEDURE — 1159F MED LIST DOCD IN RCRD: CPT | Mod: ,,, | Performed by: OBSTETRICS & GYNECOLOGY

## 2023-03-21 PROCEDURE — 3078F PR MOST RECENT DIASTOLIC BLOOD PRESSURE < 80 MM HG: ICD-10-PCS | Mod: ,,, | Performed by: OBSTETRICS & GYNECOLOGY

## 2023-03-21 RX ORDER — ALENDRONATE SODIUM 70 MG/1
70 TABLET ORAL
Qty: 12 TABLET | Refills: 3 | Status: SHIPPED | OUTPATIENT
Start: 2023-03-21 | End: 2024-01-24 | Stop reason: SDUPTHER

## 2023-03-21 NOTE — PROGRESS NOTES
Subjective:       Patient ID: Chaya Walls is a 67 y.o. female.    Chief Complaint: Follow-up and Osteoporosis (Seen in Jan for yearly, Here for Osteo Consult, see letter sent)    Presents for osteoporosis discussion only.      Recent bone density revealed T-score of-3 for the femoral neck.      Discussed this is considered osteoporosis.    Risks versus benefits of Fosamax discussed.      We will check osteoporosis lab.  If no obvious secondary reasons for osteoporosis we will proceed with Fosamax treatment.    Review of Systems      Objective:      Physical Exam    Assessment:       1. Osteoporosis, unspecified osteoporosis type, unspecified pathological fracture presence          Plan:       Patient Instructions   Osteoporosis lab drawn.      Osteoporosis Management Handout             Please be aware the management of osteoporosis is not simple.  The options of who needs treatment, what type of treatment and HOW LONG TO TREAT may vary according to different recommendations published in medical literature.           Please understand fractures can and do occur even if a patient is properly treated. We can reduce fractures with treatment but not prevent all.           The two most concerning risks of either Prolia or bisphosphonates (ie Fosamax, Boniva or Actonel or Iv Reclast) are osteonecrosis of the jaw (ONJ) or atypical fractures of the femur (AFF). These are rare risks of treatment but can occur.           You should have yearly exams by your dentist to detect any signs of osteonecrosis of the jaw (OJN). You should also let your dentist know BEFORE a dental procedure is done that you are on a bisphosphonate or Prolia           Now let's discuss the decision of how long to treat. If you are on a bisphosphonate (ie Fosamax, Boniva or Actonel or Iv Reclast) I want you to begin a drug holiday after 5 years. (off medication for one year then reassess further treatment) Therefore please notify me if you have been  on a bisphosphonate  treatment for 5 years.           IF YOU ARE RECEIVING PROLIA, the studies showing relative safety of Prolia are up to 10 years. Some of my patients are reaching the 10 year august with Prolia .    The decision when to stop Prolia is complicated. Some studies, but not all, have shown an increased fracture rate over the normal baseline when stopping Prolia .           The two potential complications, ONJ and AFF can come at any year of treatment and not necessarily with extended treatment. Never less our studies only extend to 10 years.           IF YOU ARE RECEIVING PROLIA , please ask my nurse how long you have been receiving Prolia. If you have been taking it for 10 years,  I  want to review your most recent  bone density test and make a recommendation regarding further management. Of course, this will be a shared decision between us since there is no one consensus.           The options of studies I have read are to simply stop treatment, continue Prolia indefinitely or substitute another treatment ie Evista. If Evista is started we will need to discuss the risks vs benefits of Evista.  As I have often told my patients there are risks vs benefits of every medication we prescribe.  For your review, I have included graphs from the American Association of Clinical Endocrinologists Guidelines .           This shows the reduction in fractures with treatment (Prolia or Bisphosphonates) and the rare incidences of ONJ or AFF.  Please ask my nurse how long you have been on treatment with each office visit .  Of course you are welcome to obtain a second opinion from your primary care physician. In addition if you would like a second opinion from [facility] Osteoporosis clinic I will obtain you an appointment. Please be aware the wait time on these appoints can be up to 6 months.  Thank you,     Dr. Suhas Palomino

## 2023-03-21 NOTE — PATIENT INSTRUCTIONS
Osteoporosis lab drawn.      Osteoporosis Management Handout             Please be aware the management of osteoporosis is not simple.  The options of who needs treatment, what type of treatment and HOW LONG TO TREAT may vary according to different recommendations published in medical literature.           Please understand fractures can and do occur even if a patient is properly treated. We can reduce fractures with treatment but not prevent all.           The two most concerning risks of either Prolia or bisphosphonates (ie Fosamax, Boniva or Actonel or Iv Reclast) are osteonecrosis of the jaw (ONJ) or atypical fractures of the femur (AFF). These are rare risks of treatment but can occur.           You should have yearly exams by your dentist to detect any signs of osteonecrosis of the jaw (OJN). You should also let your dentist know BEFORE a dental procedure is done that you are on a bisphosphonate or Prolia           Now let's discuss the decision of how long to treat. If you are on a bisphosphonate (ie Fosamax, Boniva or Actonel or Iv Reclast) I want you to begin a drug holiday after 5 years. (off medication for one year then reassess further treatment) Therefore please notify me if you have been on a bisphosphonate  treatment for 5 years.           IF YOU ARE RECEIVING PROLIA, the studies showing relative safety of Prolia are up to 10 years. Some of my patients are reaching the 10 year august with Prolia .    The decision when to stop Prolia is complicated. Some studies, but not all, have shown an increased fracture rate over the normal baseline when stopping Prolia .           The two potential complications, ONJ and AFF can come at any year of treatment and not necessarily with extended treatment. Never less our studies only extend to 10 years.           IF YOU ARE RECEIVING PROLIA , please ask my nurse how long you have been receiving Prolia. If you have been taking it for 10 years,  I  want to review your  most recent  bone density test and make a recommendation regarding further management. Of course, this will be a shared decision between us since there is no one consensus.           The options of studies I have read are to simply stop treatment, continue Prolia indefinitely or substitute another treatment ie Evista. If Evista is started we will need to discuss the risks vs benefits of Evista.  As I have often told my patients there are risks vs benefits of every medication we prescribe.  For your review, I have included graphs from the American Association of Clinical Endocrinologists Guidelines .           This shows the reduction in fractures with treatment (Prolia or Bisphosphonates) and the rare incidences of ONJ or AFF.  Please ask my nurse how long you have been on treatment with each office visit .  Of course you are welcome to obtain a second opinion from your primary care physician. In addition if you would like a second opinion from [facility] Osteoporosis clinic I will obtain you an appointment. Please be aware the wait time on these appoints can be up to 6 months.  Thank you,     Dr. Suhas Palomino

## 2023-03-22 ENCOUNTER — TELEPHONE (OUTPATIENT)
Dept: OBSTETRICS AND GYNECOLOGY | Facility: CLINIC | Age: 68
End: 2023-03-22
Payer: COMMERCIAL

## 2023-03-22 NOTE — TELEPHONE ENCOUNTER
Discussed osteoporosis lab was within normal limits.  However I noted her blood sugar was 197.  This was not fasting.  I have asked her to follow-up with her primary care provider for a fasting blood sugar.    However she may begin Fosamax as directed.

## 2023-03-24 DIAGNOSIS — R73.9 HYPERGLYCEMIA: Primary | ICD-10-CM

## 2023-06-05 PROBLEM — J18.9 PNEUMONIA OF LEFT UPPER LOBE DUE TO INFECTIOUS ORGANISM: Status: RESOLVED | Noted: 2023-02-13 | Resolved: 2023-06-05

## 2023-07-12 RX ORDER — ERGOCALCIFEROL 1.25 MG/1
CAPSULE ORAL
Qty: 12 CAPSULE | Refills: 0 | Status: SHIPPED | OUTPATIENT
Start: 2023-07-12 | End: 2023-10-04 | Stop reason: SDUPTHER

## 2023-10-04 ENCOUNTER — OFFICE VISIT (OUTPATIENT)
Dept: FAMILY MEDICINE | Facility: CLINIC | Age: 68
End: 2023-10-04
Payer: COMMERCIAL

## 2023-10-04 VITALS
HEIGHT: 65 IN | OXYGEN SATURATION: 97 % | RESPIRATION RATE: 18 BRPM | BODY MASS INDEX: 23.32 KG/M2 | TEMPERATURE: 98 F | HEART RATE: 76 BPM | SYSTOLIC BLOOD PRESSURE: 153 MMHG | DIASTOLIC BLOOD PRESSURE: 83 MMHG | WEIGHT: 140 LBS

## 2023-10-04 DIAGNOSIS — Z13.220 SCREENING FOR LIPOID DISORDERS: ICD-10-CM

## 2023-10-04 DIAGNOSIS — Z13.1 SCREENING FOR DIABETES MELLITUS: ICD-10-CM

## 2023-10-04 DIAGNOSIS — Z00.00 ROUTINE GENERAL MEDICAL EXAMINATION AT A HEALTH CARE FACILITY: Primary | ICD-10-CM

## 2023-10-04 PROCEDURE — 99397 PR PREVENTIVE VISIT,EST,65 & OVER: ICD-10-PCS | Mod: ,,,

## 2023-10-04 PROCEDURE — 3288F FALL RISK ASSESSMENT DOCD: CPT | Mod: ,,,

## 2023-10-04 PROCEDURE — 99397 PER PM REEVAL EST PAT 65+ YR: CPT | Mod: ,,,

## 2023-10-04 PROCEDURE — 3079F DIAST BP 80-89 MM HG: CPT | Mod: ,,,

## 2023-10-04 PROCEDURE — 1160F RVW MEDS BY RX/DR IN RCRD: CPT | Mod: ,,,

## 2023-10-04 PROCEDURE — 3044F PR MOST RECENT HEMOGLOBIN A1C LEVEL <7.0%: ICD-10-PCS | Mod: ,,,

## 2023-10-04 PROCEDURE — 1160F PR REVIEW ALL MEDS BY PRESCRIBER/CLIN PHARMACIST DOCUMENTED: ICD-10-PCS | Mod: ,,,

## 2023-10-04 PROCEDURE — 3044F HG A1C LEVEL LT 7.0%: CPT | Mod: ,,,

## 2023-10-04 PROCEDURE — 3288F PR FALLS RISK ASSESSMENT DOCUMENTED: ICD-10-PCS | Mod: ,,,

## 2023-10-04 PROCEDURE — 1101F PR PT FALLS ASSESS DOC 0-1 FALLS W/OUT INJ PAST YR: ICD-10-PCS | Mod: ,,,

## 2023-10-04 PROCEDURE — 1101F PT FALLS ASSESS-DOCD LE1/YR: CPT | Mod: ,,,

## 2023-10-04 PROCEDURE — 1159F MED LIST DOCD IN RCRD: CPT | Mod: ,,,

## 2023-10-04 PROCEDURE — 3079F PR MOST RECENT DIASTOLIC BLOOD PRESSURE 80-89 MM HG: ICD-10-PCS | Mod: ,,,

## 2023-10-04 PROCEDURE — 1126F AMNT PAIN NOTED NONE PRSNT: CPT | Mod: ,,,

## 2023-10-04 PROCEDURE — 3077F SYST BP >= 140 MM HG: CPT | Mod: ,,,

## 2023-10-04 PROCEDURE — 3077F PR MOST RECENT SYSTOLIC BLOOD PRESSURE >= 140 MM HG: ICD-10-PCS | Mod: ,,,

## 2023-10-04 PROCEDURE — 1126F PR PAIN SEVERITY QUANTIFIED, NO PAIN PRESENT: ICD-10-PCS | Mod: ,,,

## 2023-10-04 PROCEDURE — 1159F PR MEDICATION LIST DOCUMENTED IN MEDICAL RECORD: ICD-10-PCS | Mod: ,,,

## 2023-10-04 PROCEDURE — 3008F BODY MASS INDEX DOCD: CPT | Mod: ,,,

## 2023-10-04 PROCEDURE — 3008F PR BODY MASS INDEX (BMI) DOCUMENTED: ICD-10-PCS | Mod: ,,,

## 2023-10-04 RX ORDER — ERGOCALCIFEROL 1.25 MG/1
50000 CAPSULE ORAL
Qty: 12 CAPSULE | Refills: 0 | Status: SHIPPED | OUTPATIENT
Start: 2023-10-04

## 2023-10-04 NOTE — PROGRESS NOTES
Subjective     Patient ID: Chaya Walls is a 67 y.o. female.    Chief Complaint: Healthy You and Health Maintenance (Hepatitis C Screening Never done/Pneumococcal Vaccines (Age 65+)(1 - PCV) Never done/HIV Screening Never done/TETANUS VACCINE Never done/Shingles Vaccine(1 of 2) Never done/COVID-19 Vaccine(4 - Moderna series) due on 12/27/2021/Influenza Vaccine(1) Never done/)      Pt here for HY today. She is not fasting today, will come back on Monday for labs fasting.        Review of Systems   Constitutional:  Negative for activity change, appetite change and fatigue.   HENT:  Negative for trouble swallowing.    Eyes:  Negative for pain and visual disturbance.   Respiratory:  Negative for chest tightness and shortness of breath.    Cardiovascular:  Negative for chest pain and palpitations.   Gastrointestinal:  Negative for change in bowel habit.   Genitourinary:  Negative for difficulty urinating and dysuria.   Musculoskeletal:  Negative for arthralgias and myalgias.   Integumentary:  Negative for rash and wound.   Neurological:  Negative for weakness and headaches.   Psychiatric/Behavioral:  The patient is not nervous/anxious.             Objective     Physical Exam  Vitals and nursing note reviewed.   Constitutional:       Appearance: Normal appearance. She is not ill-appearing.   HENT:      Head: Normocephalic and atraumatic.      Right Ear: Tympanic membrane, ear canal and external ear normal.      Left Ear: Tympanic membrane, ear canal and external ear normal.      Nose: Nose normal.      Mouth/Throat:      Mouth: Mucous membranes are moist.      Pharynx: Oropharynx is clear.   Eyes:      Extraocular Movements: Extraocular movements intact.      Conjunctiva/sclera: Conjunctivae normal.      Pupils: Pupils are equal, round, and reactive to light.   Cardiovascular:      Rate and Rhythm: Normal rate and regular rhythm.      Pulses: Normal pulses.      Heart sounds: Normal heart sounds.   Pulmonary:       Effort: Pulmonary effort is normal. No respiratory distress.      Breath sounds: Normal breath sounds.   Abdominal:      General: Bowel sounds are normal.      Tenderness: There is no abdominal tenderness.   Musculoskeletal:         General: Normal range of motion.      Cervical back: Normal range of motion and neck supple. No tenderness.   Skin:     General: Skin is warm and dry.      Capillary Refill: Capillary refill takes less than 2 seconds.   Neurological:      General: No focal deficit present.      Mental Status: She is alert and oriented to person, place, and time.   Psychiatric:         Mood and Affect: Mood normal.         Behavior: Behavior normal.         Thought Content: Thought content normal.         Judgment: Judgment normal.              Assessment and Plan     1. Routine general medical examination at a health care facility    2. Screening for diabetes mellitus  -     Glucose, Fasting; Future; Expected date: 10/04/2023    3. Screening for lipoid disorders  -     Lipid Panel; Future; Expected date: 10/04/2023    Other orders  -     ergocalciferol (ERGOCALCIFEROL) 50,000 unit Cap; Take 1 capsule (50,000 Units total) by mouth every 7 days.  Dispense: 12 capsule; Refill: 0               Follow up in 1 year (on 10/4/2024) for Yearly Wellness Visit.

## 2023-10-04 NOTE — PATIENT INSTRUCTIONS
"Patient Education       Diet and Health   The Basics   Written by the doctors and editors at [facility]   Why is it important to eat a healthy diet? -- It's important to eat a healthy diet because eating the right foods can keep you healthy now and later on in life.  Which foods are especially healthy? -- Foods that are especially healthy include:  Fruits and vegetables - Eating a diet with lots of fruits and vegetables can help prevent heart disease and strokes. It might also help prevent certain types of cancers. Try to eat fruits and vegetables at each meal and also for snacks. If you don't have fresh fruits and vegetables available, you can eat frozen or canned ones instead. Doctors recommend eating at least 2 1/2 servings of vegetables and 2 servings of fruits each day.  Foods with fiber - Eating foods with a lot of fiber can help prevent heart disease and strokes. If you have type 2 diabetes, it can also help control your blood sugar. Foods that have a lot of fiber include vegetables, fruits, beans, nuts, oatmeal, and whole grain breads and cereals. You can tell how much fiber is in a food by reading the nutrition label (figure 1). Doctors recommend eating 25 to 36 grams of fiber each day.  Foods with folate (also called folic acid) - Folate is a vitamin that is important for pregnant people, since it helps prevent certain birth defects. Anyone who could get pregnant should get at least 400 micrograms of folic acid daily, whether or not they are actively trying to get pregnant. Folate is found in many breakfast cereals, oranges, orange juice, and green leafy vegetables.  Foods with calcium and vitamin D - Babies, children, and adults need calcium and vitamin D to help keep their bones strong. Adults also need calcium and vitamin D to help prevent osteoporosis. Osteoporosis is a condition that causes bones to get "thin" and break more easily than usual. Different foods and drinks have calcium and vitamin D in " "them (figure 2). People who don't get enough calcium and vitamin D in their diet might need to take a supplement.  Foods with protein - Protein helps your muscles stay strong. Healthy foods with a lot of protein include chicken, fish, eggs, beans, nuts, and soy products. Red meat also has a lot of protein, but it also contains fats, which can be unhealthy.  Some experts recommend a "Mediterranean diet." This involves eating a lot of fruits, vegetables, nuts, whole grains, and olive oil. It also includes some fish, poultry, and dairy products, but not a lot of red meat. Eating this way can help your overall health, and might even lower your risk of having a stroke.  What foods should I avoid or limit? -- To eat a healthy diet, there are some things you should avoid or limit. They include:   Fats - There are different types of fats. Some types of fats are better for your body than others.  Trans fats are especially unhealthy. They are found in margarines, many fast foods, and some store-bought baked goods. Trans fats can raise your cholesterol level and your increase your chance of getting heart disease. You should avoid eating foods with these types of fats.  The type of "polyunsaturated" fats found in fish seems to be healthy and can reduce your chance of getting heart disease. Other polyunsaturated fats might also be good for your health. When you cook, it's best to use oils with healthier fats, such as olive oil and canola oil.  Sugar - To have a healthy diet, it's important to limit or avoid sugar, sweets, and refined grains. Refined grains are found in white bread, white rice, most forms of pasta, and most packaged "snack" foods. Whole grains, such as whole-wheat bread and brown rice, have more fiber and are better for your health.  Avoiding sugar-sweetened beverages, like soda and sports drinks, can also help improve your health.  Red meat - Studies have shown that eating a lot of red meat can increase your " "risk of certain health problems, including heart disease and cancer. You should limit the amount of red meat that you eat.  Can I drink alcohol as part of a healthy diet? -- People who drink a small amount of alcohol each day might have a lower chance of getting heart disease. But drinking alcohol can lead to problems. For example, it can raise a person's chances of getting liver disease and certain types of cancers. In women, even 1 drink a day can increase the risk of getting breast cancer.  Most doctors recommend that adult women not have more than 1 drink a day and that adult men not have more than 2 drinks a day. The limits are different because most women's bodies take longer to break down alcohol.  How many calories do I need each day? -- The number of calories you need each day depends on your weight, height, age, sex, and how active you are.  Your doctor or nurse can tell you how many calories you should eat each day. If you are trying to lose weight, you should eat fewer calories each day.  What if I have questions? -- If you have questions about which foods you should or should not eat, ask your doctor or nurse. The right diet for you will depend, in part, on your health and any medical conditions you have.  All topics are updated as new evidence becomes available and our peer review process is complete.  This topic retrieved from [employer] on: Sep 21, 2021.  Topic 73196 Version 20.0  Release: 29.4.2 - C29.263  © 2021 UpToDate, Inc. and/or its affiliates. All rights reserved.  figure 1: Nutrition label - fiber     This is an example of a nutrition label. To figure out how much fiber is in a food, look for the line that says "Dietary Fiber." It's also important to look at the serving size. This food has 7 grams of fiber in each serving, and each serving is 1 cup.  Graphic 28245 Version 7.0    figure 2: Foods and drinks with calcium and vitamin D     Foods rich in calcium include ice cream, soy milk, breads, " "kale, broccoli, milk, cheese, cottage cheese, almonds, yogurt, ready-to-eat cereals, beans, and tofu. Foods rich in vitamin D include milk, fortified plant-based "milks" (soy, almond), canned tuna fish, cod liver oil, yogurt, ready-to-eat-cereals, cooked salmon, canned sardines, mackerel, and eggs. Some of these foods are rich in both.  Graphic 44682 Version 4.0    Consumer Information Use and Disclaimer   This information is not specific medical advice and does not replace information you receive from your health care provider. This is only a brief summary of general information. It does NOT include all information about conditions, illnesses, injuries, tests, procedures, treatments, therapies, discharge instructions or life-style choices that may apply to you. You must talk with your health care provider for complete information about your health and treatment options. This information should not be used to decide whether or not to accept your health care provider's advice, instructions or recommendations. Only your health care provider has the knowledge and training to provide advice that is right for you. The use of this information is governed by the [employer] End User License Agreement, available at https://[email or web address]/en/solutions/[employer] Ghoster/about/jarrett.The use of Attributor content is governed by the Attributor Terms of Use. ©2021 UpToDate, Inc. All rights reserved.  Copyright   © 2021 UpToDate, Inc. and/or its affiliates. All rights reserved.    "

## 2023-10-05 ENCOUNTER — PATIENT OUTREACH (OUTPATIENT)
Dept: ADMINISTRATIVE | Facility: HOSPITAL | Age: 68
End: 2023-10-05

## 2023-10-05 NOTE — PROGRESS NOTES
Post visit Population Health review of encounter with date of service  10/4/2023 with Gustavo.  All required HY components in encounter Except labs are in as future message sent to provider's staff pool via staff message. Gabriella   Followup appt for: 10/4/2024 HY

## 2024-01-08 PROBLEM — Z00.00 ROUTINE GENERAL MEDICAL EXAMINATION AT A HEALTH CARE FACILITY: Status: RESOLVED | Noted: 2022-06-15 | Resolved: 2024-01-08

## 2024-01-24 ENCOUNTER — OFFICE VISIT (OUTPATIENT)
Dept: OBSTETRICS AND GYNECOLOGY | Facility: CLINIC | Age: 69
End: 2024-01-24
Payer: MEDICARE

## 2024-01-24 ENCOUNTER — HOSPITAL ENCOUNTER (OUTPATIENT)
Dept: RADIOLOGY | Facility: HOSPITAL | Age: 69
Discharge: HOME | End: 2024-01-24
Attending: OBSTETRICS & GYNECOLOGY
Payer: MEDICARE

## 2024-01-24 VITALS
BODY MASS INDEX: 23.3 KG/M2 | HEIGHT: 65 IN | WEIGHT: 143.81 LBS | SYSTOLIC BLOOD PRESSURE: 120 MMHG | WEIGHT: 140 LBS | BODY MASS INDEX: 23.96 KG/M2 | DIASTOLIC BLOOD PRESSURE: 70 MMHG

## 2024-01-24 DIAGNOSIS — Z01.419 WOMEN'S ANNUAL ROUTINE GYNECOLOGICAL EXAMINATION: Primary | ICD-10-CM

## 2024-01-24 DIAGNOSIS — M81.0 OSTEOPOROSIS, UNSPECIFIED OSTEOPOROSIS TYPE, UNSPECIFIED PATHOLOGICAL FRACTURE PRESENCE: ICD-10-CM

## 2024-01-24 DIAGNOSIS — Z12.31 BREAST CANCER SCREENING BY MAMMOGRAM: ICD-10-CM

## 2024-01-24 PROCEDURE — 3288F FALL RISK ASSESSMENT DOCD: CPT | Mod: CPTII,,, | Performed by: OBSTETRICS & GYNECOLOGY

## 2024-01-24 PROCEDURE — 77067 SCR MAMMO BI INCL CAD: CPT | Mod: TC

## 2024-01-24 PROCEDURE — 3008F BODY MASS INDEX DOCD: CPT | Mod: CPTII,,, | Performed by: OBSTETRICS & GYNECOLOGY

## 2024-01-24 PROCEDURE — 1159F MED LIST DOCD IN RCRD: CPT | Mod: CPTII,,, | Performed by: OBSTETRICS & GYNECOLOGY

## 2024-01-24 PROCEDURE — 3074F SYST BP LT 130 MM HG: CPT | Mod: CPTII,,, | Performed by: OBSTETRICS & GYNECOLOGY

## 2024-01-24 PROCEDURE — 1126F AMNT PAIN NOTED NONE PRSNT: CPT | Mod: CPTII,,, | Performed by: OBSTETRICS & GYNECOLOGY

## 2024-01-24 PROCEDURE — 1101F PT FALLS ASSESS-DOCD LE1/YR: CPT | Mod: CPTII,,, | Performed by: OBSTETRICS & GYNECOLOGY

## 2024-01-24 PROCEDURE — 99397 PER PM REEVAL EST PAT 65+ YR: CPT | Mod: S$PBB,,, | Performed by: OBSTETRICS & GYNECOLOGY

## 2024-01-24 PROCEDURE — 3078F DIAST BP <80 MM HG: CPT | Mod: CPTII,,, | Performed by: OBSTETRICS & GYNECOLOGY

## 2024-01-24 PROCEDURE — 99214 OFFICE O/P EST MOD 30 MIN: CPT | Mod: PBBFAC | Performed by: OBSTETRICS & GYNECOLOGY

## 2024-01-24 RX ORDER — PROPRANOLOL HYDROCHLORIDE 80 MG/1
80 CAPSULE, EXTENDED RELEASE ORAL DAILY
COMMUNITY
Start: 2024-01-04

## 2024-01-24 RX ORDER — ALENDRONATE SODIUM 70 MG/1
70 TABLET ORAL
Qty: 12 TABLET | Refills: 3 | Status: SHIPPED | OUTPATIENT
Start: 2024-01-24 | End: 2025-01-23

## 2024-01-24 NOTE — PATIENT INSTRUCTIONS
Continue Fosamax 70 mg once weekly.  Discussed I usually repeat bone densities 2 years after beginning treatment    Discussed follow-up with Yanique Menjivar in 1 year for repeat bone density testing and further management.      She will continue calcium with vitamin-D supplements.      Continue yearly screening mammography notify me if she has not heard from report.    She will continue colonoscopy screening as directed by gastroenterology.    Routine glucose cholesterol testing by primary care provider.

## 2024-01-24 NOTE — PROGRESS NOTES
Subjective:       Patient ID: Chaya Walls is a 68 y.o. female.    Chief Complaint: Well Woman (Here for check up. )     Presents for routine gyn check.      No specific complaints.    She did have breast cancer left breast proximally 11 years ago.  This was found on routine mammography screening she had lumpectomy followed by radiation.  Doing well.  She has been released by her oncologist.      Review of Systems      Objective:      Physical Exam  Exam conducted with a chaperone present.   HENT:      Head: Normocephalic.      Nose: Nose normal.   Eyes:      Pupils: Pupils are equal, round, and reactive to light.   Cardiovascular:      Rate and Rhythm: Normal rate.   Pulmonary:      Effort: Pulmonary effort is normal.      Breath sounds: Normal breath sounds.   Chest:      Comments:   Breasts examination revealed no obvious palpable masses.  In the upper outer quadrant of left breast there was obvious scar from previous lumpectomy.  However no concerning nodularity noted.  No adenopathy of the axilla noted bilaterally.      She did have mammography screening done today.  She will notify me if she has not heard from report within 2 weeks  Abdominal:      General: Abdomen is flat.      Palpations: Abdomen is soft.   Genitourinary:     General: Normal vulva.      Vagina: Normal.      Cervix: Normal.      Uterus: Normal.       Adnexa: Right adnexa normal and left adnexa normal.      Rectum: Normal.      Comments:  External normal vault normal cervix normal to appearance previous Pap 2021- with negative HPV therefore Pap not repeated today.    Bimanual exam revealed uterus normal size no adnexal masses noted on vaginal or rectovaginal examination hemoccult was negative colonoscopy screening 2020.  Musculoskeletal:         General: Normal range of motion.      Cervical back: Full passive range of motion without pain, normal range of motion and neck supple.      Comments:   Bone density screening February 2023 revealed  T-score for the femoral neck of -3.0.  She was placed on Fosamax at that time.   Skin:     General: Skin is dry.   Neurological:      General: No focal deficit present.      Mental Status: She is alert.   Psychiatric:         Attention and Perception: Attention normal.         Mood and Affect: Mood normal.         Assessment:       1. Women's annual routine gynecological examination    2. Osteoporosis, unspecified osteoporosis type, unspecified pathological fracture presence        Plan:       Patient Instructions     Continue Fosamax 70 mg once weekly.  Discussed I usually repeat bone densities 2 years after beginning treatment    Discussed follow-up with Yanique Menjivar in 1 year for repeat bone density testing and further management.      She will continue calcium with vitamin-D supplements.      Continue yearly screening mammography notify me if she has not heard from report.    She will continue colonoscopy screening as directed by gastroenterology.    Routine glucose cholesterol testing by primary care provider.

## 2024-02-09 DIAGNOSIS — Z71.89 COMPLEX CARE COORDINATION: ICD-10-CM

## 2024-03-05 ENCOUNTER — OFFICE VISIT (OUTPATIENT)
Dept: OBSTETRICS AND GYNECOLOGY | Facility: CLINIC | Age: 69
End: 2024-03-05
Payer: MEDICARE

## 2024-03-05 VITALS
WEIGHT: 148.19 LBS | HEIGHT: 65 IN | SYSTOLIC BLOOD PRESSURE: 124 MMHG | BODY MASS INDEX: 24.69 KG/M2 | DIASTOLIC BLOOD PRESSURE: 60 MMHG

## 2024-03-05 DIAGNOSIS — R35.0 URINARY FREQUENCY: ICD-10-CM

## 2024-03-05 DIAGNOSIS — N81.4 CYSTOCELE WITH UTERINE PROLAPSE: Primary | ICD-10-CM

## 2024-03-05 DIAGNOSIS — N39.46 MIXED STRESS AND URGE URINARY INCONTINENCE: ICD-10-CM

## 2024-03-05 LAB
BILIRUB UR QL STRIP: NEGATIVE
CLARITY UR: CLEAR
COLOR UR: COLORLESS
GLUCOSE UR STRIP-MCNC: NORMAL MG/DL
KETONES UR STRIP-SCNC: NEGATIVE MG/DL
LEUKOCYTE ESTERASE UR QL STRIP: NEGATIVE
NITRITE UR QL STRIP: NEGATIVE
PH UR STRIP: 5 PH UNITS
PROT UR QL STRIP: NEGATIVE
RBC # UR STRIP: NEGATIVE /UL
SP GR UR STRIP: 1
SQUAMOUS #/AREA URNS LPF: ABNORMAL /HPF
UROBILINOGEN UR STRIP-ACNC: NORMAL MG/DL
WBC #/AREA URNS HPF: <1 /HPF

## 2024-03-05 PROCEDURE — 99212 OFFICE O/P EST SF 10 MIN: CPT | Mod: S$PBB,,, | Performed by: OBSTETRICS & GYNECOLOGY

## 2024-03-05 PROCEDURE — 3078F DIAST BP <80 MM HG: CPT | Mod: CPTII,,, | Performed by: OBSTETRICS & GYNECOLOGY

## 2024-03-05 PROCEDURE — 1126F AMNT PAIN NOTED NONE PRSNT: CPT | Mod: CPTII,,, | Performed by: OBSTETRICS & GYNECOLOGY

## 2024-03-05 PROCEDURE — 3074F SYST BP LT 130 MM HG: CPT | Mod: CPTII,,, | Performed by: OBSTETRICS & GYNECOLOGY

## 2024-03-05 PROCEDURE — 87086 URINE CULTURE/COLONY COUNT: CPT | Mod: ,,, | Performed by: CLINICAL MEDICAL LABORATORY

## 2024-03-05 PROCEDURE — 1159F MED LIST DOCD IN RCRD: CPT | Mod: CPTII,,, | Performed by: OBSTETRICS & GYNECOLOGY

## 2024-03-05 PROCEDURE — 1101F PT FALLS ASSESS-DOCD LE1/YR: CPT | Mod: CPTII,,, | Performed by: OBSTETRICS & GYNECOLOGY

## 2024-03-05 PROCEDURE — 81001 URINALYSIS AUTO W/SCOPE: CPT | Mod: ,,, | Performed by: CLINICAL MEDICAL LABORATORY

## 2024-03-05 PROCEDURE — 3008F BODY MASS INDEX DOCD: CPT | Mod: CPTII,,, | Performed by: OBSTETRICS & GYNECOLOGY

## 2024-03-05 PROCEDURE — 99213 OFFICE O/P EST LOW 20 MIN: CPT | Mod: PBBFAC | Performed by: OBSTETRICS & GYNECOLOGY

## 2024-03-05 PROCEDURE — 3288F FALL RISK ASSESSMENT DOCD: CPT | Mod: CPTII,,, | Performed by: OBSTETRICS & GYNECOLOGY

## 2024-03-05 NOTE — PROGRESS NOTES
Subjective:       Patient ID: Chaya Walls is a 68 y.o. female.    Chief Complaint: Vaginal Prolapse (Pt presents c/o increase vaginal pressure/not emptying bladder completely, urinary frequency. )    Presents for problem visit.      Patient was seen January 2024 for routine visit at that time no significant problems were noted.      However over at least the last month she is noted pressure sensation in the vaginal vault.  She admits to picking up heavy boxes at work.      She has had recently had significant amount of physical work to do at work.      She has noted a bulging in the vaginal vault with progressive worsening incontinence now having to wear pads on a daily basis.      Review of Systems      Objective:      Physical Exam  Genitourinary:     Comments: On exam today external genitalia normal.  Speculum exam reveals cervix normal to appearance.  However using 1/2 of speculum the anterior and posterior aspects of the vaginal vault were examined during Valsalva    There was obviously a large bulging midline cystocele of approximately 3rd to near 4th degree.  In addition there is at least a second-degree uterine prolapse.    Interestingly no significant rectocele was noted.      She has very adequate support along vaginal sidewalls.    Following examination urethra was prepped with Betadine she was catheterized urine clear nevertheless we will send for culture.        Assessment:       1. Cystocele with uterine prolapse    2. Urinary frequency    3. Mixed stress and urge urinary incontinence        Plan:       Patient Instructions   Later in the office we discussed in detail her findings of significant uterine prolapse with cystocele.    Patient was shown pictures of uterine prolapse and cystocele     Discussed the options of simply observation versus pessary versus proceeding with surgery.      We will schedule bladder stress test.      At present she is considering proceeding with surgery.

## 2024-03-05 NOTE — PATIENT INSTRUCTIONS
Later in the office we discussed in detail her findings of significant uterine prolapse with cystocele.    Patient was shown pictures of uterine prolapse and cystocele     Discussed the options of simply observation versus pessary versus proceeding with surgery.      We will schedule bladder stress test.      At present she is considering proceeding with surgery.

## 2024-03-07 LAB — UA COMPLETE W REFLEX CULTURE PNL UR: NO GROWTH

## 2024-03-26 ENCOUNTER — PROCEDURE VISIT (OUTPATIENT)
Dept: OBSTETRICS AND GYNECOLOGY | Facility: CLINIC | Age: 69
End: 2024-03-26
Payer: MEDICARE

## 2024-03-26 VITALS — DIASTOLIC BLOOD PRESSURE: 70 MMHG | SYSTOLIC BLOOD PRESSURE: 132 MMHG

## 2024-03-26 DIAGNOSIS — N39.46 MIXED STRESS AND URGE URINARY INCONTINENCE: Primary | ICD-10-CM

## 2024-03-26 NOTE — PATIENT INSTRUCTIONS
Discussed today that bladder stress test is consistent with stress urinary incontinence.  She is wearing pads on a daily basis.      Discussed third-degree uterine prolapse along with third-degree cystocele.  Discussed options of pessary versus     Proceeding with laparoscopically assisted vaginal hysterectomy with BSO with anterior and posterior vaginal colporrhaphy along with T OT procedure.    The risk of surgery including anesthetic risk bleeding infection blood clots bowel bladder or ureter injury discussed.      Success rates of T OT procedure including 85% chance of stopping incontinence 11% chance of decreasing incontinence 3-4% chance of failure.    Surgery to be scheduled.

## 2024-03-26 NOTE — PROCEDURES
Presents for bladder stress test.    Patient was allowed to void.  She was catheterized.  Had a proximally 3 cc residual.  Previous urinalysis was negative.      The bladder was then filled to 300 cc of saline.  There was continuous filling with no obvious bladder contractions.      Catheter was removed.  With Valsalva profuse incontinence was noted.  Examination revealed 1/3 degrees cystocele hypermobility urethral neck third-degree uterine prolapse.  Bimanual exam revealed uterus normal size no adnexal masses.      Surgery to be scheduled.

## 2024-04-05 ENCOUNTER — TELEPHONE (OUTPATIENT)
Dept: OBSTETRICS AND GYNECOLOGY | Facility: CLINIC | Age: 69
End: 2024-04-05
Payer: MEDICARE

## 2024-04-05 NOTE — TELEPHONE ENCOUNTER
----- Message from Paz Arellano sent at 4/5/2024 10:09 AM CDT -----  Pt is asking if surgery date has been set. She is on break now and can talk. Call [phone]. Can leave message if she doesn't answer.

## 2024-04-08 DIAGNOSIS — Z01.818 PRE-OP EXAMINATION: Primary | ICD-10-CM

## 2024-04-15 ENCOUNTER — TELEPHONE (OUTPATIENT)
Dept: OBSTETRICS AND GYNECOLOGY | Facility: CLINIC | Age: 69
End: 2024-04-15
Payer: MEDICARE

## 2024-04-15 NOTE — TELEPHONE ENCOUNTER
Returned call to pt,and instructions were given in regards to pre-op labs/xray and EKG. Pt verbalizes understanding.

## 2024-04-15 NOTE — TELEPHONE ENCOUNTER
----- Message from Rahel Solares sent at 4/15/2024  8:23 AM CDT -----  Pt calling to verify if lab work ,EKG, and chest xray needs to be done before 830 pre op appt or if she can do it after - Call back # [phone]

## 2024-05-14 ENCOUNTER — CLINICAL SUPPORT (OUTPATIENT)
Dept: CARDIOLOGY | Facility: CLINIC | Age: 69
End: 2024-05-14
Payer: MEDICARE

## 2024-05-14 ENCOUNTER — HOSPITAL ENCOUNTER (OUTPATIENT)
Dept: RADIOLOGY | Facility: HOSPITAL | Age: 69
Discharge: HOME | End: 2024-05-14
Attending: OBSTETRICS & GYNECOLOGY
Payer: MEDICARE

## 2024-05-14 ENCOUNTER — OFFICE VISIT (OUTPATIENT)
Dept: OBSTETRICS AND GYNECOLOGY | Facility: CLINIC | Age: 69
End: 2024-05-14
Payer: MEDICARE

## 2024-05-14 VITALS
DIASTOLIC BLOOD PRESSURE: 72 MMHG | HEART RATE: 72 BPM | WEIGHT: 152 LBS | HEIGHT: 65 IN | TEMPERATURE: 98 F | SYSTOLIC BLOOD PRESSURE: 128 MMHG | BODY MASS INDEX: 25.33 KG/M2 | OXYGEN SATURATION: 97 %

## 2024-05-14 DIAGNOSIS — R14.0 ABDOMINAL BLOATING: Primary | ICD-10-CM

## 2024-05-14 DIAGNOSIS — Z01.818 PRE-OP EXAMINATION: ICD-10-CM

## 2024-05-14 DIAGNOSIS — R14.0 ABDOMINAL BLOATING: ICD-10-CM

## 2024-05-14 PROCEDURE — 76830 TRANSVAGINAL US NON-OB: CPT | Mod: TC

## 2024-05-14 PROCEDURE — 3288F FALL RISK ASSESSMENT DOCD: CPT | Mod: CPTII,,, | Performed by: OBSTETRICS & GYNECOLOGY

## 2024-05-14 PROCEDURE — 1126F AMNT PAIN NOTED NONE PRSNT: CPT | Mod: CPTII,,, | Performed by: OBSTETRICS & GYNECOLOGY

## 2024-05-14 PROCEDURE — 1101F PT FALLS ASSESS-DOCD LE1/YR: CPT | Mod: CPTII,,, | Performed by: OBSTETRICS & GYNECOLOGY

## 2024-05-14 PROCEDURE — 76830 TRANSVAGINAL US NON-OB: CPT | Mod: 26,,, | Performed by: RADIOLOGY

## 2024-05-14 PROCEDURE — 99024 POSTOP FOLLOW-UP VISIT: CPT | Mod: ,,, | Performed by: OBSTETRICS & GYNECOLOGY

## 2024-05-14 PROCEDURE — 99214 OFFICE O/P EST MOD 30 MIN: CPT | Mod: PBBFAC,25 | Performed by: OBSTETRICS & GYNECOLOGY

## 2024-05-14 PROCEDURE — 71046 X-RAY EXAM CHEST 2 VIEWS: CPT | Mod: TC

## 2024-05-14 PROCEDURE — 99212 OFFICE O/P EST SF 10 MIN: CPT | Mod: PBBFAC,25

## 2024-05-14 PROCEDURE — 3078F DIAST BP <80 MM HG: CPT | Mod: CPTII,,, | Performed by: OBSTETRICS & GYNECOLOGY

## 2024-05-14 PROCEDURE — 1159F MED LIST DOCD IN RCRD: CPT | Mod: CPTII,,, | Performed by: OBSTETRICS & GYNECOLOGY

## 2024-05-14 PROCEDURE — 3074F SYST BP LT 130 MM HG: CPT | Mod: CPTII,,, | Performed by: OBSTETRICS & GYNECOLOGY

## 2024-05-14 PROCEDURE — 93005 ELECTROCARDIOGRAM TRACING: CPT | Mod: PBBFAC | Performed by: HOSPITALIST

## 2024-05-14 PROCEDURE — 76856 US EXAM PELVIC COMPLETE: CPT | Mod: 26,,, | Performed by: RADIOLOGY

## 2024-05-14 PROCEDURE — 93010 ELECTROCARDIOGRAM REPORT: CPT | Mod: S$PBB,,, | Performed by: HOSPITALIST

## 2024-05-14 NOTE — PROGRESS NOTES
[facility] - [facility] Gyne[facility]  Obstetrics & Gynecology  History & Physical    Patient Name: Chaya Walls  MRN: [id number]  Admission Date: (Not on file)  Primary Care Provider: Misa Reyes MD    Subjective:     Chief Complaint/Reason for Admission:  Admitted for laparoscopic-assisted hysterectomy with bilateral salpingo-oophorectomy anterior and posterior vaginal colporrhaphy with T OT procedure.    History of Present Illness:         Progress Notes    Wilner Palomino MD at 3/5/2024  3:00 PM    Status: Signed   Expand All Collapse All     Subjective:         Subjective  Patient ID: Chaya Walls is a 68 y.o. female.     Chief Complaint: Vaginal Prolapse (Pt presents c/o increase vaginal pressure/not emptying bladder completely, urinary frequency. )     Presents for problem visit.       Patient was seen January 2024 for routine visit at that time no significant problems were noted.       However over at least the last month she is noted pressure sensation in the vaginal vault.  She admits to picking up heavy boxes at work.       She has had recently had significant amount of physical work to do at work.       She has noted a bulging in the vaginal vault with progressive worsening incontinence now having to wear pads on a daily basis.        Review of Systems        Objective:      Objective  Physical Exam  Genitourinary:     Comments: On exam today external genitalia normal.  Speculum exam reveals cervix normal to appearance.  However using 1/2 of speculum the anterior and posterior aspects of the vaginal vault were examined during Valsalva     There was obviously a large bulging midline cystocele of approximately 3rd to near 4th degree.  In addition there is at least a second-degree uterine prolapse.     Interestingly no significant rectocele was noted.       She has very adequate support along vaginal sidewalls.     Following examination urethra was prepped with Betadine she was  catheterized urine clear nevertheless we will send for culture.           Assessment:      Assessment  1. Cystocele with uterine prolapse    2. Urinary frequency    3. Mixed stress and urge urinary incontinence          Plan:      Plan  Patient Instructions   Later in the office we discussed in detail her findings of significant uterine prolapse with cystocele.     Patient was shown pictures of uterine prolapse and cystocele      Discussed the options of simply observation versus pessary versus proceeding with surgery.       We will schedule bladder stress test.       At present she is considering proceeding with surgery.                  Electronically signed by Wilner Palomino MD at 3/5/2024  3:45 PM     Instructions    Later in the office we discussed in detail her findings of significant uterine prolapse with cystocele.     Patient was shown pictures of uterine prolapse and cystocele      Discussed the options of simply observation versus pessary versus proceeding with surgery.       We will schedule bladder stress test.       At present she is considering proceeding with surgery.           Procedure visit  3/26/2024  [facility]     Wilner Palomino MD  Obstetrics and Gynecology Mixed stress and urge urinary incontinence  Dx Urinary Incontinence   Reason for Visit       Procedures  Wilner Palomino MD (Physician)  Obstetrics and Gynecology  Presents for bladder stress test.     Patient was allowed to void.  She was catheterized.  Had a proximally 3 cc residual.  Previous urinalysis was negative.       The bladder was then filled to 300 cc of saline.  There was continuous filling with no obvious bladder contractions.       Catheter was removed.  With Valsalva profuse incontinence was noted.  Examination revealed  third-degree cystocele hypermobility urethral neck third-degree uterine prolapse.  Bimanual exam revealed uterus normal size no adnexal masses.         Surgery to be scheduled.               Instructions    Discussed today that bladder stress test is consistent with stress urinary incontinence.  She is wearing pads on a daily basis.       Discussed third-degree uterine prolapse along with third-degree cystocele.  Discussed options of pessary versus      Proceeding with laparoscopically assisted vaginal hysterectomy with BSO with anterior and posterior vaginal colporrhaphy along with T OT procedure.     The risk of surgery including anesthetic risk bleeding infection blood clots bowel bladder or ureter injury discussed.       Success rates of T OT procedure including 85% chance of stopping incontinence 11% chance of decreasing incontinence 3-4% chance of failure.     Surgery to be scheduled.            Preoperative appointment 05/14/2024    Re discussed proceeding with  laparoscopically assisted vaginal hysterectomy  with BSO andwith anterior and posterior vaginal colporrhaphy and T OT procedure    Discussed this will tightened vaginal vault and intercourse in the future may be difficult.  She has not sexually active at present.  Does not plan to be sexually active in the future.  Current Outpatient Medications on File Prior to Visit   Medication Sig    alendronate (FOSAMAX) 70 MG tablet Take 1 tablet (70 mg total) by mouth every 7 days.    multivitamin (THERAGRAN) per tablet Take 1 tablet by mouth once daily.    propranoloL (INDERAL LA) 80 MG 24 hr capsule Take 80 mg by mouth once daily.    ergocalciferol (ERGOCALCIFEROL) 50,000 unit Cap Take 1 capsule (50,000 Units total) by mouth every 7 days. (Patient not taking: Reported on 1/24/2024)     No current facility-administered medications on file prior to visit.       Review of patient's allergies indicates:   Allergen Reactions    Sulfa (sulfonamide antibiotics)        Past Medical History:   Diagnosis Date    Breast cancer     Known health problems: none     Pneumonia of left upper lobe due to infectious organism  2023     OB History    Para Term  AB Living   1 1 1         SAB IAB Ectopic Multiple Live Births                  # Outcome Date GA Lbr Naun/2nd Weight Sex Type Anes PTL Lv   1 Term              Past Surgical History:   Procedure Laterality Date    ARTHROSCOPY OF KNEE Left 2021    Procedure: ARTHROSCOPY, KNEE;  Surgeon: Pino Varma MD;  Location: [facility];  Service: Orthopedics;  Laterality: Left;    BREAST BIOPSY      BREAST LUMPECTOMY      CARDIAC CATHETERIZATION      CHOLECYSTECTOMY      DILATION AND CURETTAGE OF UTERUS      with hysteroscopy    LUMPECTOMY,BREAST, WITH RADIOACTIVE SEED LOCALIZATION AND SENTINEL LYMPH NODE BIOPSY Left     SHOULDER ARTHROSCOPY Bilateral     TUBAL LIGATION       Family History       Problem Relation (Age of Onset)    Breast cancer Daughter    Heart disease Father    Hypertension Mother    No Known Problems Sister, Brother          Tobacco Use    Smoking status: Former    Smokeless tobacco: Never   Substance and Sexual Activity    Alcohol use: Never    Drug use: Never    Sexual activity: Never     Review of Systems   Respiratory: Negative.     Cardiovascular: Negative.          Patient stated she had a heart catheterization done approximately 10 years ago.  She was told Coronary arteries were completely normal.    Chest pain was apparently secondary to a fall and contusion of the chest    Her preoperative EKG has been reviewed by Dr. Britton and cleared for surgery.   Endocrine: Negative.    Neurological: Negative.    Hematological: Negative.    Breast:           History of breast cancer in the past.   Successfully treated with with lumpectomy and radiation           Objective:     Vital Signs (Most Recent):  Temp: 98.4 °F (36.9 °C) (24 0857)  Pulse: 72 (24 0857)  BP: 128/72 (24 0857)  SpO2: 97 % (24 0857) Vital Signs (24h Range):  [unfilled]     Weight: 68.9 kg (152 lb)  Body mass index is 25.29 kg/m².  No LMP  recorded. Patient is postmenopausal.    Physical Exam:   Constitutional: She appears well-developed and well-nourished.     Eyes: Pupils are equal, round, and reactive to light. EOM are normal.     Cardiovascular:  Normal rate, regular rhythm, normal heart sounds and intact distal pulses.             Pulmonary/Chest: Effort normal and breath sounds normal.     Scar from previous lumpectomy left breast upper outer quadrant.   No palpable masses in either breast.        Abdominal: Soft. Bowel sounds are normal.     Genitourinary:    Genitourinary Comments: Bulging third-degree cystocele at the vaginal introitus second-degree uterine prolapse    Second-degree rectocele was noted today.      Cervix normal to appearance previous   Pap class 1 normal    Bimanual exam revealed uterus normal size no adnexal masses noted on vaginal or rectovaginal examination.      Follow-up ultrasound done today revealed uterus measuring 5.1 x 1.9 x 3.1 cm.      Right ovary 2.0 x 1.0 x 1.0 cm.  Left ovary 2.0 x 1.1 x 1.3 cm.  No significant free fluid was visualized within the pelvis.  Some bowel gas was noted.                          Skin: Skin is warm.    Psychiatric: She has a normal mood and affect. Her behavior is normal. Judgment and thought content normal.       Laboratory:    Lab Visit on 05/14/2024   Component Date Value Ref Range Status    Sodium 05/14/2024 143  136 - 145 mmol/L Final    Potassium 05/14/2024 4.5  3.5 - 5.1 mmol/L Final    Chloride 05/14/2024 112 (H)  98 - 107 mmol/L Final    CO2 05/14/2024 26  21 - 32 mmol/L Final    Anion Gap 05/14/2024 10  7 - 16 mmol/L Final    Glucose 05/14/2024 105  74 - 106 mg/dL Final    BUN 05/14/2024 18  7 - 18 mg/dL Final    Creatinine 05/14/2024 0.97  0.55 - 1.02 mg/dL Final    BUN/Creatinine Ratio 05/14/2024 19  6 - 20 Final    Calcium 05/14/2024 8.8  8.5 - 10.1 mg/dL Final    Total Protein 05/14/2024 7.6  6.4 - 8.2 g/dL Final    Albumin 05/14/2024 4.0  3.5 - 5.0 g/dL Final     Globulin 05/14/2024 3.6  2.0 - 4.0 g/dL Final    A/G Ratio 05/14/2024 1.1   Final    Bilirubin, Total 05/14/2024 0.3  >0.0 - 1.2 mg/dL Final    Alk Phos 05/14/2024 69  55 - 142 U/L Final    ALT 05/14/2024 20  13 - 56 U/L Final    AST 05/14/2024 24  15 - 37 U/L Final    eGFR 05/14/2024 65  >=60 mL/min/1.73m2 Final    Pregnancy, Serum 05/14/2024 Negative  Negative, QNS Final    WBC 05/14/2024 5.46  4.50 - 11.00 K/uL Final    RBC 05/14/2024 4.60  4.20 - 5.40 M/uL Final    Hemoglobin 05/14/2024 13.3  12.0 - 16.0 g/dL Final    Hematocrit 05/14/2024 41.2  38.0 - 47.0 % Final    MCV 05/14/2024 89.6  80.0 - 96.0 fL Final    MCH 05/14/2024 28.9  27.0 - 31.0 pg Final    MCHC 05/14/2024 32.3  32.0 - 36.0 g/dL Final    RDW 05/14/2024 12.8  11.5 - 14.5 % Final    Platelet Count 05/14/2024 253  150 - 400 K/uL Final    MPV 05/14/2024 10.8  9.4 - 12.4 fL Final    Neutrophils % 05/14/2024 61.0  53.0 - 65.0 % Final    Lymphocytes % 05/14/2024 25.8 (L)  27.0 - 41.0 % Final    Monocytes % 05/14/2024 9.0 (H)  2.0 - 6.0 % Final    Eosinophils % 05/14/2024 3.5  1.0 - 4.0 % Final    Basophils % 05/14/2024 0.5  0.0 - 1.0 % Final    Immature Granulocytes % 05/14/2024 0.2  0.0 - 0.4 % Final    nRBC, Auto 05/14/2024 0.0  <=0.0 % Final    Neutrophils, Abs 05/14/2024 3.33  1.80 - 7.70 K/uL Final    Lymphocytes, Absolute 05/14/2024 1.41  1.00 - 4.80 K/uL Final    Monocytes, Absolute 05/14/2024 0.49  0.00 - 0.80 K/uL Final    Eosinophils, Absolute 05/14/2024 0.19  0.00 - 0.50 K/uL Final    Basophils, Absolute 05/14/2024 0.03  0.00 - 0.20 K/uL Final    Immature Granulocytes, Absolute 05/14/2024 0.01  0.00 - 0.04 K/uL Final    nRBC, Absolute 05/14/2024 0.00  <=0.00 x10e3/uL Final    Diff Type 05/14/2024 Auto   Final        Diagnostic Results:      X-Ray Chest PA And Lateral    Narrative  EXAMINATION:  XR CHEST PA AND LATERAL    CLINICAL HISTORY:  Encounter for other preprocedural examination    COMPARISON:  28 February 2023    TECHNIQUE:  XR CHEST  PA AND LATERAL    FINDINGS:  The heart and mediastinum are normal in size and configuration.  The pulmonary vascularity is normal in caliber. Lung volumes are increased with prominent bronchial markings.  No lung infiltrates, effusions, pneumothorax or other abnormality is demonstrated.    Impression  Chronic lung changes.  No acute process or significant change.      Electronically signed by: Nav Butcher  Date:    05/14/2024  Time:    07:48    EKG reviewed and cleared by Dr. Alexander      Assessment/Plan:   Discussed proceeding with laparoscopically assisted vaginal hysterectomy with bilateral salpingo-oophorectomy anterior and posterior vaginal colporrhaphy along with T OT procedure.    The risk of surgery including anesthetic risk bleeding infection blood clots bowel bladder or ureter injury discussed.      Discussed success rates of incontinence 85% chance of stopping incontinence 11% chance of decreasing incontinence 3-4% chance of failure.      Pyridium prescribed.          Wilner Palomino MD  Obstetrics & Gynecology  [facility]

## 2024-05-14 NOTE — H&P (VIEW-ONLY)
[facility] - [facility]s And Gynecology  Obstetrics & Gynecology  History & Physical    Patient Name: Chaya Walls  MRN: [id number]  Admission Date: (Not on file)  Primary Care Provider: Misa Reyes MD    Subjective:     Chief Complaint/Reason for Admission:  Admitted for laparoscopic-assisted hysterectomy with bilateral salpingo-oophorectomy anterior and posterior vaginal colporrhaphy with T OT procedure.    History of Present Illness:         Progress Notes    Wilner Palomino MD at 3/5/2024  3:00 PM    Status: Signed   Expand All Collapse All     Subjective:         Subjective  Patient ID: Chaya Walls is a 68 y.o. female.     Chief Complaint: Vaginal Prolapse (Pt presents c/o increase vaginal pressure/not emptying bladder completely, urinary frequency. )     Presents for problem visit.       Patient was seen January 2024 for routine visit at that time no significant problems were noted.       However over at least the last month she is noted pressure sensation in the vaginal vault.  She admits to picking up heavy boxes at work.       She has had recently had significant amount of physical work to do at work.       She has noted a bulging in the vaginal vault with progressive worsening incontinence now having to wear pads on a daily basis.        Review of Systems        Objective:      Objective  Physical Exam  Genitourinary:     Comments: On exam today external genitalia normal.  Speculum exam reveals cervix normal to appearance.  However using 1/2 of speculum the anterior and posterior aspects of the vaginal vault were examined during Valsalva     There was obviously a large bulging midline cystocele of approximately 3rd to near 4th degree.  In addition there is at least a second-degree uterine prolapse.     Interestingly no significant rectocele was noted.       She has very adequate support along vaginal sidewalls.     Following examination urethra was prepped with Betadine she was  catheterized urine clear nevertheless we will send for culture.           Assessment:      Assessment  1. Cystocele with uterine prolapse    2. Urinary frequency    3. Mixed stress and urge urinary incontinence          Plan:      Plan  Patient Instructions   Later in the office we discussed in detail her findings of significant uterine prolapse with cystocele.     Patient was shown pictures of uterine prolapse and cystocele      Discussed the options of simply observation versus pessary versus proceeding with surgery.       We will schedule bladder stress test.       At present she is considering proceeding with surgery.                  Electronically signed by Wilner Palomino MD at 3/5/2024  3:45 PM     Instructions    Later in the office we discussed in detail her findings of significant uterine prolapse with cystocele.     Patient was shown pictures of uterine prolapse and cystocele      Discussed the options of simply observation versus pessary versus proceeding with surgery.       We will schedule bladder stress test.       At present she is considering proceeding with surgery.           Procedure visit  3/26/2024  [facility]     Wilner Palomino MD  Obstetrics and Gynecology Mixed stress and urge urinary incontinence  Dx Urinary Incontinence   Reason for Visit       Procedures  Wilner Palomino MD (Physician)  Obstetrics and Gynecology  Presents for bladder stress test.     Patient was allowed to void.  She was catheterized.  Had a proximally 3 cc residual.  Previous urinalysis was negative.       The bladder was then filled to 300 cc of saline.  There was continuous filling with no obvious bladder contractions.       Catheter was removed.  With Valsalva profuse incontinence was noted.  Examination revealed  third-degree cystocele hypermobility urethral neck third-degree uterine prolapse.  Bimanual exam revealed uterus normal size no adnexal masses.         Surgery to be scheduled.               Instructions    Discussed today that bladder stress test is consistent with stress urinary incontinence.  She is wearing pads on a daily basis.       Discussed third-degree uterine prolapse along with third-degree cystocele.  Discussed options of pessary versus      Proceeding with laparoscopically assisted vaginal hysterectomy with BSO with anterior and posterior vaginal colporrhaphy along with T OT procedure.     The risk of surgery including anesthetic risk bleeding infection blood clots bowel bladder or ureter injury discussed.       Success rates of T OT procedure including 85% chance of stopping incontinence 11% chance of decreasing incontinence 3-4% chance of failure.     Surgery to be scheduled.            Preoperative appointment 05/14/2024    Re discussed proceeding with  laparoscopically assisted vaginal hysterectomy  with BSO andwith anterior and posterior vaginal colporrhaphy and T OT procedure    Discussed this will tightened vaginal vault and intercourse in the future may be difficult.  She has not sexually active at present.  Does not plan to be sexually active in the future.  Current Outpatient Medications on File Prior to Visit   Medication Sig    alendronate (FOSAMAX) 70 MG tablet Take 1 tablet (70 mg total) by mouth every 7 days.    multivitamin (THERAGRAN) per tablet Take 1 tablet by mouth once daily.    propranoloL (INDERAL LA) 80 MG 24 hr capsule Take 80 mg by mouth once daily.    ergocalciferol (ERGOCALCIFEROL) 50,000 unit Cap Take 1 capsule (50,000 Units total) by mouth every 7 days. (Patient not taking: Reported on 1/24/2024)     No current facility-administered medications on file prior to visit.       Review of patient's allergies indicates:   Allergen Reactions    Sulfa (sulfonamide antibiotics)        Past Medical History:   Diagnosis Date    Breast cancer     Known health problems: none     Pneumonia of left upper lobe due to infectious organism  2023     OB History    Para Term  AB Living   1 1 1         SAB IAB Ectopic Multiple Live Births                  # Outcome Date GA Lbr Naun/2nd Weight Sex Type Anes PTL Lv   1 Term              Past Surgical History:   Procedure Laterality Date    ARTHROSCOPY OF KNEE Left 2021    Procedure: ARTHROSCOPY, KNEE;  Surgeon: Pino Varma MD;  Location: [facility];  Service: Orthopedics;  Laterality: Left;    BREAST BIOPSY      BREAST LUMPECTOMY      CARDIAC CATHETERIZATION      CHOLECYSTECTOMY      DILATION AND CURETTAGE OF UTERUS      with hysteroscopy    LUMPECTOMY,BREAST, WITH RADIOACTIVE SEED LOCALIZATION AND SENTINEL LYMPH NODE BIOPSY Left     SHOULDER ARTHROSCOPY Bilateral     TUBAL LIGATION       Family History       Problem Relation (Age of Onset)    Breast cancer Daughter    Heart disease Father    Hypertension Mother    No Known Problems Sister, Brother          Tobacco Use    Smoking status: Former    Smokeless tobacco: Never   Substance and Sexual Activity    Alcohol use: Never    Drug use: Never    Sexual activity: Never     Review of Systems   Respiratory: Negative.     Cardiovascular: Negative.          Patient stated she had a heart catheterization done approximately 10 years ago.  She was told Coronary arteries were completely normal.    Chest pain was apparently secondary to a fall and contusion of the chest    Her preoperative EKG has been reviewed by Dr. Britton and cleared for surgery.   Endocrine: Negative.    Neurological: Negative.    Hematological: Negative.    Breast:           History of breast cancer in the past.   Successfully treated with with lumpectomy and radiation           Objective:     Vital Signs (Most Recent):  Temp: 98.4 °F (36.9 °C) (24 0857)  Pulse: 72 (24 0857)  BP: 128/72 (24 0857)  SpO2: 97 % (24 0857) Vital Signs (24h Range):  [unfilled]     Weight: 68.9 kg (152 lb)  Body mass index is 25.29 kg/m².  No LMP  recorded. Patient is postmenopausal.    Physical Exam:   Constitutional: She appears well-developed and well-nourished.     Eyes: Pupils are equal, round, and reactive to light. EOM are normal.     Cardiovascular:  Normal rate, regular rhythm, normal heart sounds and intact distal pulses.             Pulmonary/Chest: Effort normal and breath sounds normal.     Scar from previous lumpectomy left breast upper outer quadrant.   No palpable masses in either breast.        Abdominal: Soft. Bowel sounds are normal.     Genitourinary:    Genitourinary Comments: Bulging third-degree cystocele at the vaginal introitus second-degree uterine prolapse    Second-degree rectocele was noted today.      Cervix normal to appearance previous   Pap class 1 normal    Bimanual exam revealed uterus normal size no adnexal masses noted on vaginal or rectovaginal examination.      Follow-up ultrasound done today revealed uterus measuring 5.1 x 1.9 x 3.1 cm.      Right ovary 2.0 x 1.0 x 1.0 cm.  Left ovary 2.0 x 1.1 x 1.3 cm.  No significant free fluid was visualized within the pelvis.  Some bowel gas was noted.                          Skin: Skin is warm.    Psychiatric: She has a normal mood and affect. Her behavior is normal. Judgment and thought content normal.       Laboratory:    Lab Visit on 05/14/2024   Component Date Value Ref Range Status    Sodium 05/14/2024 143  136 - 145 mmol/L Final    Potassium 05/14/2024 4.5  3.5 - 5.1 mmol/L Final    Chloride 05/14/2024 112 (H)  98 - 107 mmol/L Final    CO2 05/14/2024 26  21 - 32 mmol/L Final    Anion Gap 05/14/2024 10  7 - 16 mmol/L Final    Glucose 05/14/2024 105  74 - 106 mg/dL Final    BUN 05/14/2024 18  7 - 18 mg/dL Final    Creatinine 05/14/2024 0.97  0.55 - 1.02 mg/dL Final    BUN/Creatinine Ratio 05/14/2024 19  6 - 20 Final    Calcium 05/14/2024 8.8  8.5 - 10.1 mg/dL Final    Total Protein 05/14/2024 7.6  6.4 - 8.2 g/dL Final    Albumin 05/14/2024 4.0  3.5 - 5.0 g/dL Final     Globulin 05/14/2024 3.6  2.0 - 4.0 g/dL Final    A/G Ratio 05/14/2024 1.1   Final    Bilirubin, Total 05/14/2024 0.3  >0.0 - 1.2 mg/dL Final    Alk Phos 05/14/2024 69  55 - 142 U/L Final    ALT 05/14/2024 20  13 - 56 U/L Final    AST 05/14/2024 24  15 - 37 U/L Final    eGFR 05/14/2024 65  >=60 mL/min/1.73m2 Final    Pregnancy, Serum 05/14/2024 Negative  Negative, QNS Final    WBC 05/14/2024 5.46  4.50 - 11.00 K/uL Final    RBC 05/14/2024 4.60  4.20 - 5.40 M/uL Final    Hemoglobin 05/14/2024 13.3  12.0 - 16.0 g/dL Final    Hematocrit 05/14/2024 41.2  38.0 - 47.0 % Final    MCV 05/14/2024 89.6  80.0 - 96.0 fL Final    MCH 05/14/2024 28.9  27.0 - 31.0 pg Final    MCHC 05/14/2024 32.3  32.0 - 36.0 g/dL Final    RDW 05/14/2024 12.8  11.5 - 14.5 % Final    Platelet Count 05/14/2024 253  150 - 400 K/uL Final    MPV 05/14/2024 10.8  9.4 - 12.4 fL Final    Neutrophils % 05/14/2024 61.0  53.0 - 65.0 % Final    Lymphocytes % 05/14/2024 25.8 (L)  27.0 - 41.0 % Final    Monocytes % 05/14/2024 9.0 (H)  2.0 - 6.0 % Final    Eosinophils % 05/14/2024 3.5  1.0 - 4.0 % Final    Basophils % 05/14/2024 0.5  0.0 - 1.0 % Final    Immature Granulocytes % 05/14/2024 0.2  0.0 - 0.4 % Final    nRBC, Auto 05/14/2024 0.0  <=0.0 % Final    Neutrophils, Abs 05/14/2024 3.33  1.80 - 7.70 K/uL Final    Lymphocytes, Absolute 05/14/2024 1.41  1.00 - 4.80 K/uL Final    Monocytes, Absolute 05/14/2024 0.49  0.00 - 0.80 K/uL Final    Eosinophils, Absolute 05/14/2024 0.19  0.00 - 0.50 K/uL Final    Basophils, Absolute 05/14/2024 0.03  0.00 - 0.20 K/uL Final    Immature Granulocytes, Absolute 05/14/2024 0.01  0.00 - 0.04 K/uL Final    nRBC, Absolute 05/14/2024 0.00  <=0.00 x10e3/uL Final    Diff Type 05/14/2024 Auto   Final        Diagnostic Results:      X-Ray Chest PA And Lateral    Narrative  EXAMINATION:  XR CHEST PA AND LATERAL    CLINICAL HISTORY:  Encounter for other preprocedural examination    COMPARISON:  28 February 2023    TECHNIQUE:  XR CHEST  PA AND LATERAL    FINDINGS:  The heart and mediastinum are normal in size and configuration.  The pulmonary vascularity is normal in caliber. Lung volumes are increased with prominent bronchial markings.  No lung infiltrates, effusions, pneumothorax or other abnormality is demonstrated.    Impression  Chronic lung changes.  No acute process or significant change.      Electronically signed by: Nav Butcher  Date:    05/14/2024  Time:    07:48    EKG reviewed and cleared by Dr. Alexander      Assessment/Plan:   Discussed proceeding with laparoscopically assisted vaginal hysterectomy with bilateral salpingo-oophorectomy anterior and posterior vaginal colporrhaphy along with T OT procedure.    The risk of surgery including anesthetic risk bleeding infection blood clots bowel bladder or ureter injury discussed.      Discussed success rates of incontinence 85% chance of stopping incontinence 11% chance of decreasing incontinence 3-4% chance of failure.      Pyridium prescribed.          Wilner Palomino MD  Obstetrics & Gynecology  [facility]

## 2024-05-14 NOTE — PATIENT INSTRUCTIONS
Discussed proceeding with laparoscopically assisted vaginal hysterectomy with bilateral salpingo-oophorectomy anterior and posterior vaginal colporrhaphy along with T OT procedure.    The risk of surgery including anesthetic risk bleeding infection blood clots bowel bladder or ureter injury discussed.      Discussed success rates of incontinence 85% chance of stopping incontinence 11% chance of decreasing incontinence 3-4% chance of failure.      Pyridium prescribed.

## 2024-05-20 ENCOUNTER — ANESTHESIA (OUTPATIENT)
Dept: SURGERY | Facility: HOSPITAL | Age: 69
End: 2024-05-20
Payer: MEDICARE

## 2024-05-20 ENCOUNTER — HOSPITAL ENCOUNTER (OUTPATIENT)
Facility: HOSPITAL | Age: 69
Discharge: HOME | End: 2024-05-23
Attending: OBSTETRICS & GYNECOLOGY | Admitting: OBSTETRICS & GYNECOLOGY
Payer: MEDICARE

## 2024-05-20 ENCOUNTER — ANESTHESIA EVENT (OUTPATIENT)
Dept: SURGERY | Facility: HOSPITAL | Age: 69
End: 2024-05-20
Payer: MEDICARE

## 2024-05-20 ENCOUNTER — TELEPHONE (OUTPATIENT)
Dept: OBSTETRICS AND GYNECOLOGY | Facility: CLINIC | Age: 69
End: 2024-05-20
Payer: MEDICARE

## 2024-05-20 DIAGNOSIS — N81.3 CYSTOCELE WITH THIRD DEGREE UTERINE PROLAPSE: ICD-10-CM

## 2024-05-20 DIAGNOSIS — N39.3 STRESS INCONTINENCE IN FEMALE: ICD-10-CM

## 2024-05-20 DIAGNOSIS — N81.4 CYSTOCELE WITH UTERINE PROLAPSE: ICD-10-CM

## 2024-05-20 LAB
ABORH RETYPE: NORMAL
B-HCG UR QL: NEGATIVE
CTP QC/QA: YES
HCT VFR BLD AUTO: 42.9 % (ref 38–47)
HCT VFR BLD AUTO: 45.9 % (ref 38–47)
HGB BLD-MCNC: 13.7 G/DL (ref 12–16)
HGB BLD-MCNC: 14.5 G/DL (ref 12–16)
INDIRECT COOMBS: NORMAL
OHS QRS DURATION: 92 MS
OHS QTC CALCULATION: 399 MS
RH BLD: NORMAL
SPECIMEN OUTDATE: NORMAL

## 2024-05-20 PROCEDURE — 99900035 HC TECH TIME PER 15 MIN (STAT)

## 2024-05-20 PROCEDURE — 63600175 PHARM REV CODE 636 W HCPCS: Performed by: NURSE ANESTHETIST, CERTIFIED REGISTERED

## 2024-05-20 PROCEDURE — 71000039 HC RECOVERY, EACH ADD'L HOUR: Performed by: OBSTETRICS & GYNECOLOGY

## 2024-05-20 PROCEDURE — D9220A PRA ANESTHESIA: Mod: ANES,,, | Performed by: ANESTHESIOLOGY

## 2024-05-20 PROCEDURE — 63600175 PHARM REV CODE 636 W HCPCS: Performed by: OBSTETRICS & GYNECOLOGY

## 2024-05-20 PROCEDURE — 36000710: Performed by: OBSTETRICS & GYNECOLOGY

## 2024-05-20 PROCEDURE — 25000003 PHARM REV CODE 250: Performed by: ANESTHESIOLOGY

## 2024-05-20 PROCEDURE — 36415 COLL VENOUS BLD VENIPUNCTURE: CPT | Performed by: OBSTETRICS & GYNECOLOGY

## 2024-05-20 PROCEDURE — 25000003 PHARM REV CODE 250: Performed by: OBSTETRICS & GYNECOLOGY

## 2024-05-20 PROCEDURE — 27000689 HC BLADE LARYNGOSCOPE ANY SIZE: Performed by: NURSE ANESTHETIST, CERTIFIED REGISTERED

## 2024-05-20 PROCEDURE — 27201423 OPTIME MED/SURG SUP & DEVICES STERILE SUPPLY: Performed by: OBSTETRICS & GYNECOLOGY

## 2024-05-20 PROCEDURE — 63600175 PHARM REV CODE 636 W HCPCS: Performed by: ANESTHESIOLOGY

## 2024-05-20 PROCEDURE — 88305 TISSUE EXAM BY PATHOLOGIST: CPT | Mod: TC,SUR | Performed by: OBSTETRICS & GYNECOLOGY

## 2024-05-20 PROCEDURE — 27000510 HC BLANKET BAIR HUGGER ANY SIZE: Performed by: NURSE ANESTHETIST, CERTIFIED REGISTERED

## 2024-05-20 PROCEDURE — 27000716 HC OXISENSOR PROBE, ANY SIZE: Performed by: NURSE ANESTHETIST, CERTIFIED REGISTERED

## 2024-05-20 PROCEDURE — 36000711: Performed by: OBSTETRICS & GYNECOLOGY

## 2024-05-20 PROCEDURE — 85014 HEMATOCRIT: CPT | Performed by: OBSTETRICS & GYNECOLOGY

## 2024-05-20 PROCEDURE — 57288 REPAIR BLADDER DEFECT: CPT | Mod: 51,,, | Performed by: OBSTETRICS & GYNECOLOGY

## 2024-05-20 PROCEDURE — 88307 TISSUE EXAM BY PATHOLOGIST: CPT | Mod: 26,,, | Performed by: PATHOLOGY

## 2024-05-20 PROCEDURE — 85018 HEMOGLOBIN: CPT | Performed by: OBSTETRICS & GYNECOLOGY

## 2024-05-20 PROCEDURE — 37000008 HC ANESTHESIA 1ST 15 MINUTES: Performed by: OBSTETRICS & GYNECOLOGY

## 2024-05-20 PROCEDURE — 57260 CMBN ANT PST COLPRHY: CPT | Mod: 51,,, | Performed by: OBSTETRICS & GYNECOLOGY

## 2024-05-20 PROCEDURE — 37000009 HC ANESTHESIA EA ADD 15 MINS: Performed by: OBSTETRICS & GYNECOLOGY

## 2024-05-20 PROCEDURE — 27000655: Performed by: NURSE ANESTHETIST, CERTIFIED REGISTERED

## 2024-05-20 PROCEDURE — C1771 REP DEV, URINARY, W/SLING: HCPCS | Performed by: OBSTETRICS & GYNECOLOGY

## 2024-05-20 PROCEDURE — 27000165 HC TUBE, ETT CUFFED: Performed by: NURSE ANESTHETIST, CERTIFIED REGISTERED

## 2024-05-20 PROCEDURE — 81025 URINE PREGNANCY TEST: CPT | Performed by: OBSTETRICS & GYNECOLOGY

## 2024-05-20 PROCEDURE — 25000003 PHARM REV CODE 250: Performed by: NURSE ANESTHETIST, CERTIFIED REGISTERED

## 2024-05-20 PROCEDURE — 88305 TISSUE EXAM BY PATHOLOGIST: CPT | Mod: 26,,, | Performed by: PATHOLOGY

## 2024-05-20 PROCEDURE — D9220A PRA ANESTHESIA: Mod: CRNA,,, | Performed by: NURSE ANESTHETIST, CERTIFIED REGISTERED

## 2024-05-20 PROCEDURE — 71000033 HC RECOVERY, INTIAL HOUR: Performed by: OBSTETRICS & GYNECOLOGY

## 2024-05-20 PROCEDURE — 58552 LAPARO-VAG HYST INCL T/O: CPT | Mod: ,,, | Performed by: OBSTETRICS & GYNECOLOGY

## 2024-05-20 PROCEDURE — 86901 BLOOD TYPING SEROLOGIC RH(D): CPT | Performed by: OBSTETRICS & GYNECOLOGY

## 2024-05-20 RX ORDER — FENTANYL CITRATE 50 UG/ML
INJECTION, SOLUTION INTRAMUSCULAR; INTRAVENOUS
Status: DISCONTINUED | OUTPATIENT
Start: 2024-05-20 | End: 2024-05-20

## 2024-05-20 RX ORDER — LABETALOL HYDROCHLORIDE 5 MG/ML
10 INJECTION, SOLUTION INTRAVENOUS ONCE
Status: COMPLETED | OUTPATIENT
Start: 2024-05-20 | End: 2024-05-20

## 2024-05-20 RX ORDER — GLYCOPYRROLATE 0.2 MG/ML
INJECTION INTRAMUSCULAR; INTRAVENOUS
Status: DISCONTINUED | OUTPATIENT
Start: 2024-05-20 | End: 2024-05-20

## 2024-05-20 RX ORDER — TRAMADOL HYDROCHLORIDE 50 MG/1
50 TABLET ORAL EVERY 6 HOURS PRN
Status: DISCONTINUED | OUTPATIENT
Start: 2024-05-20 | End: 2024-05-23 | Stop reason: HOSPADM

## 2024-05-20 RX ORDER — MUPIROCIN 20 MG/G
OINTMENT TOPICAL 2 TIMES DAILY
Status: DISPENSED | OUTPATIENT
Start: 2024-05-20 | End: 2024-05-23

## 2024-05-20 RX ORDER — ROCURONIUM BROMIDE 10 MG/ML
INJECTION, SOLUTION INTRAVENOUS
Status: DISCONTINUED | OUTPATIENT
Start: 2024-05-20 | End: 2024-05-20

## 2024-05-20 RX ORDER — HYDROMORPHONE HYDROCHLORIDE 2 MG/ML
INJECTION, SOLUTION INTRAMUSCULAR; INTRAVENOUS; SUBCUTANEOUS
Status: DISCONTINUED | OUTPATIENT
Start: 2024-05-20 | End: 2024-05-20

## 2024-05-20 RX ORDER — BISACODYL 10 MG/1
10 SUPPOSITORY RECTAL DAILY PRN
Status: DISCONTINUED | OUTPATIENT
Start: 2024-05-20 | End: 2024-05-23 | Stop reason: HOSPADM

## 2024-05-20 RX ORDER — FUROSEMIDE 10 MG/ML
INJECTION INTRAMUSCULAR; INTRAVENOUS
Status: DISCONTINUED | OUTPATIENT
Start: 2024-05-20 | End: 2024-05-20

## 2024-05-20 RX ORDER — MEPERIDINE HYDROCHLORIDE 25 MG/ML
25 INJECTION INTRAMUSCULAR; INTRAVENOUS; SUBCUTANEOUS ONCE AS NEEDED
Status: DISCONTINUED | OUTPATIENT
Start: 2024-05-20 | End: 2024-05-20 | Stop reason: HOSPADM

## 2024-05-20 RX ORDER — DEXAMETHASONE SODIUM PHOSPHATE 4 MG/ML
INJECTION, SOLUTION INTRA-ARTICULAR; INTRALESIONAL; INTRAMUSCULAR; INTRAVENOUS; SOFT TISSUE
Status: DISCONTINUED | OUTPATIENT
Start: 2024-05-20 | End: 2024-05-20

## 2024-05-20 RX ORDER — CEFAZOLIN SODIUM 1 G/3ML
INJECTION, POWDER, FOR SOLUTION INTRAMUSCULAR; INTRAVENOUS
Status: DISCONTINUED | OUTPATIENT
Start: 2024-05-20 | End: 2024-05-20

## 2024-05-20 RX ORDER — ONDANSETRON 4 MG/1
8 TABLET, ORALLY DISINTEGRATING ORAL EVERY 8 HOURS PRN
Status: DISCONTINUED | OUTPATIENT
Start: 2024-05-20 | End: 2024-05-23 | Stop reason: HOSPADM

## 2024-05-20 RX ORDER — ONDANSETRON HYDROCHLORIDE 2 MG/ML
INJECTION, SOLUTION INTRAVENOUS
Status: DISCONTINUED | OUTPATIENT
Start: 2024-05-20 | End: 2024-05-20

## 2024-05-20 RX ORDER — ONDANSETRON HYDROCHLORIDE 2 MG/ML
4 INJECTION, SOLUTION INTRAVENOUS DAILY PRN
Status: DISCONTINUED | OUTPATIENT
Start: 2024-05-20 | End: 2024-05-20 | Stop reason: HOSPADM

## 2024-05-20 RX ORDER — HYDROMORPHONE HYDROCHLORIDE 2 MG/ML
0.5 INJECTION, SOLUTION INTRAMUSCULAR; INTRAVENOUS; SUBCUTANEOUS EVERY 5 MIN PRN
Status: DISCONTINUED | OUTPATIENT
Start: 2024-05-20 | End: 2024-05-20 | Stop reason: HOSPADM

## 2024-05-20 RX ORDER — SODIUM CHLORIDE, SODIUM LACTATE, POTASSIUM CHLORIDE, CALCIUM CHLORIDE 600; 310; 30; 20 MG/100ML; MG/100ML; MG/100ML; MG/100ML
INJECTION, SOLUTION INTRAVENOUS CONTINUOUS
Status: DISCONTINUED | OUTPATIENT
Start: 2024-05-20 | End: 2024-05-23 | Stop reason: HOSPADM

## 2024-05-20 RX ORDER — DIPHENHYDRAMINE HYDROCHLORIDE 50 MG/ML
25 INJECTION INTRAMUSCULAR; INTRAVENOUS EVERY 6 HOURS PRN
Status: DISCONTINUED | OUTPATIENT
Start: 2024-05-20 | End: 2024-05-20 | Stop reason: HOSPADM

## 2024-05-20 RX ORDER — PHENYLEPHRINE HYDROCHLORIDE 10 MG/ML
INJECTION INTRAVENOUS
Status: DISCONTINUED | OUTPATIENT
Start: 2024-05-20 | End: 2024-05-20

## 2024-05-20 RX ORDER — HYDRALAZINE HYDROCHLORIDE 20 MG/ML
10 INJECTION INTRAMUSCULAR; INTRAVENOUS ONCE
Status: COMPLETED | OUTPATIENT
Start: 2024-05-20 | End: 2024-05-20

## 2024-05-20 RX ORDER — PROPRANOLOL HYDROCHLORIDE 40 MG/1
80 TABLET ORAL ONCE
Status: DISCONTINUED | OUTPATIENT
Start: 2024-05-20 | End: 2024-05-23 | Stop reason: HOSPADM

## 2024-05-20 RX ORDER — MORPHINE SULFATE 4 MG/ML
4 INJECTION, SOLUTION INTRAMUSCULAR; INTRAVENOUS
Status: DISCONTINUED | OUTPATIENT
Start: 2024-05-20 | End: 2024-05-23 | Stop reason: HOSPADM

## 2024-05-20 RX ORDER — MIDAZOLAM HYDROCHLORIDE 1 MG/ML
INJECTION INTRAMUSCULAR; INTRAVENOUS
Status: DISCONTINUED | OUTPATIENT
Start: 2024-05-20 | End: 2024-05-20

## 2024-05-20 RX ORDER — FAMOTIDINE 20 MG/1
20 TABLET, FILM COATED ORAL 2 TIMES DAILY
Status: DISCONTINUED | OUTPATIENT
Start: 2024-05-20 | End: 2024-05-23

## 2024-05-20 RX ORDER — FAMOTIDINE 20 MG/1
20 TABLET, FILM COATED ORAL
Status: COMPLETED | OUTPATIENT
Start: 2024-05-20 | End: 2024-05-20

## 2024-05-20 RX ORDER — IPRATROPIUM BROMIDE AND ALBUTEROL SULFATE 2.5; .5 MG/3ML; MG/3ML
3 SOLUTION RESPIRATORY (INHALATION) ONCE AS NEEDED
Status: DISCONTINUED | OUTPATIENT
Start: 2024-05-20 | End: 2024-05-20 | Stop reason: HOSPADM

## 2024-05-20 RX ORDER — EPHEDRINE SULFATE 50 MG/ML
INJECTION, SOLUTION INTRAVENOUS
Status: DISCONTINUED | OUTPATIENT
Start: 2024-05-20 | End: 2024-05-20

## 2024-05-20 RX ORDER — PROPOFOL 10 MG/ML
VIAL (ML) INTRAVENOUS
Status: DISCONTINUED | OUTPATIENT
Start: 2024-05-20 | End: 2024-05-20

## 2024-05-20 RX ORDER — IBUPROFEN 600 MG/1
600 TABLET ORAL EVERY 6 HOURS
Status: DISCONTINUED | OUTPATIENT
Start: 2024-05-21 | End: 2024-05-23 | Stop reason: HOSPADM

## 2024-05-20 RX ORDER — FLUORESCEIN 500 MG/ML
INJECTION INTRAVENOUS
Status: DISCONTINUED | OUTPATIENT
Start: 2024-05-20 | End: 2024-05-20

## 2024-05-20 RX ORDER — LIDOCAINE HYDROCHLORIDE 20 MG/ML
INJECTION, SOLUTION EPIDURAL; INFILTRATION; INTRACAUDAL; PERINEURAL
Status: DISCONTINUED | OUTPATIENT
Start: 2024-05-20 | End: 2024-05-20

## 2024-05-20 RX ORDER — MORPHINE SULFATE 10 MG/ML
4 INJECTION INTRAMUSCULAR; INTRAVENOUS; SUBCUTANEOUS EVERY 5 MIN PRN
Status: DISCONTINUED | OUTPATIENT
Start: 2024-05-20 | End: 2024-05-20 | Stop reason: HOSPADM

## 2024-05-20 RX ORDER — DIMENHYDRINATE 50 MG
50 TABLET ORAL ONCE
Status: COMPLETED | OUTPATIENT
Start: 2024-05-20 | End: 2024-05-20

## 2024-05-20 RX ORDER — DIPHENHYDRAMINE HYDROCHLORIDE 50 MG/ML
INJECTION INTRAMUSCULAR; INTRAVENOUS
Status: DISCONTINUED | OUTPATIENT
Start: 2024-05-20 | End: 2024-05-20

## 2024-05-20 RX ADMIN — HYDROMORPHONE HYDROCHLORIDE 1 MG: 2 INJECTION, SOLUTION INTRAMUSCULAR; INTRAVENOUS; SUBCUTANEOUS at 10:05

## 2024-05-20 RX ADMIN — SODIUM CHLORIDE, POTASSIUM CHLORIDE, SODIUM LACTATE AND CALCIUM CHLORIDE: 600; 310; 30; 20 INJECTION, SOLUTION INTRAVENOUS at 07:05

## 2024-05-20 RX ADMIN — LIDOCAINE HYDROCHLORIDE 100 MG: 20 INJECTION, SOLUTION INTRAVENOUS at 07:05

## 2024-05-20 RX ADMIN — SODIUM CHLORIDE, POTASSIUM CHLORIDE, SODIUM LACTATE AND CALCIUM CHLORIDE: 600; 310; 30; 20 INJECTION, SOLUTION INTRAVENOUS at 06:05

## 2024-05-20 RX ADMIN — SODIUM CHLORIDE, POTASSIUM CHLORIDE, SODIUM LACTATE AND CALCIUM CHLORIDE: 600; 310; 30; 20 INJECTION, SOLUTION INTRAVENOUS at 05:05

## 2024-05-20 RX ADMIN — FLUORESCEIN SODIUM 1 ML: 100 INJECTION INTRAVENOUS at 11:05

## 2024-05-20 RX ADMIN — CEFAZOLIN 2 G: 1 INJECTION, POWDER, FOR SOLUTION INTRAMUSCULAR; INTRAVENOUS; PARENTERAL at 07:05

## 2024-05-20 RX ADMIN — TRAMADOL HYDROCHLORIDE 50 MG: 50 TABLET, COATED ORAL at 06:05

## 2024-05-20 RX ADMIN — ROCURONIUM BROMIDE 50 MG: 10 INJECTION, SOLUTION INTRAVENOUS at 07:05

## 2024-05-20 RX ADMIN — DEXAMETHASONE SODIUM PHOSPHATE 8 MG: 4 INJECTION, SOLUTION INTRA-ARTICULAR; INTRALESIONAL; INTRAMUSCULAR; INTRAVENOUS; SOFT TISSUE at 07:05

## 2024-05-20 RX ADMIN — DEXTROSE MONOHYDRATE 1 G: 5 INJECTION INTRAVENOUS at 06:05

## 2024-05-20 RX ADMIN — FAMOTIDINE 20 MG: 20 TABLET, FILM COATED ORAL at 08:05

## 2024-05-20 RX ADMIN — MUPIROCIN: 20 OINTMENT TOPICAL at 08:05

## 2024-05-20 RX ADMIN — FUROSEMIDE 10 MG: 10 INJECTION, SOLUTION INTRAMUSCULAR; INTRAVENOUS at 11:05

## 2024-05-20 RX ADMIN — PHENYLEPHRINE HYDROCHLORIDE 100 MCG: 10 INJECTION INTRAVENOUS at 12:05

## 2024-05-20 RX ADMIN — SUGAMMADEX 200 MG: 100 INJECTION, SOLUTION INTRAVENOUS at 12:05

## 2024-05-20 RX ADMIN — DIPHENHYDRAMINE HYDROCHLORIDE 12.5 MG: 50 INJECTION INTRAMUSCULAR; INTRAVENOUS at 10:05

## 2024-05-20 RX ADMIN — PROPOFOL 150 MG: 10 INJECTION, EMULSION INTRAVENOUS at 07:05

## 2024-05-20 RX ADMIN — EPHEDRINE SULFATE 25 MG: 50 INJECTION INTRAVENOUS at 08:05

## 2024-05-20 RX ADMIN — MIDAZOLAM HYDROCHLORIDE 2 MG: 1 INJECTION, SOLUTION INTRAMUSCULAR; INTRAVENOUS at 07:05

## 2024-05-20 RX ADMIN — FENTANYL CITRATE 100 MCG: 50 INJECTION INTRAMUSCULAR; INTRAVENOUS at 07:05

## 2024-05-20 RX ADMIN — LABETALOL HYDROCHLORIDE 10 MG: 5 INJECTION INTRAVENOUS at 03:05

## 2024-05-20 RX ADMIN — CEFAZOLIN 2 G: 1 INJECTION, POWDER, FOR SOLUTION INTRAMUSCULAR; INTRAVENOUS; PARENTERAL at 11:05

## 2024-05-20 RX ADMIN — ROCURONIUM BROMIDE 50 MG: 10 INJECTION, SOLUTION INTRAVENOUS at 10:05

## 2024-05-20 RX ADMIN — GLYCOPYRROLATE 0.2 MG: 0.2 INJECTION INTRAMUSCULAR; INTRAVENOUS at 12:05

## 2024-05-20 RX ADMIN — FAMOTIDINE 20 MG: 20 TABLET ORAL at 06:05

## 2024-05-20 RX ADMIN — LABETALOL HYDROCHLORIDE 10 MG: 5 INJECTION INTRAVENOUS at 02:05

## 2024-05-20 RX ADMIN — HYDRALAZINE HYDROCHLORIDE 10 MG: 20 INJECTION INTRAMUSCULAR; INTRAVENOUS at 02:05

## 2024-05-20 RX ADMIN — ONDANSETRON 8 MG: 2 INJECTION INTRAMUSCULAR; INTRAVENOUS at 07:05

## 2024-05-20 RX ADMIN — FENTANYL CITRATE 100 MCG: 50 INJECTION INTRAMUSCULAR; INTRAVENOUS at 08:05

## 2024-05-20 RX ADMIN — DIMENHYDRINATE 50 MG: 50 TABLET ORAL at 07:05

## 2024-05-20 NOTE — TELEPHONE ENCOUNTER
----- Message from Kristine Donovan sent at 5/17/2024 11:24 AM CDT -----  Patient need to speak with the nurse it about her procedure that she is having ,[phone]

## 2024-05-20 NOTE — INTERVAL H&P NOTE
The patient has been examined and the H&P has been reviewed:    I concur with the findings and no changes have occurred since H&P was written.    Anesthesia/Surgery risks, benefits and alternative options discussed and understood by patient/family.

## 2024-05-20 NOTE — TRANSFER OF CARE
"Anesthesia Transfer of Care Note    Patient: Chaya Walls    Procedure(s) Performed: Procedure(s) (LRB):  HYSTERECTOMY,VAGINAL,LAPAROSCOPY-ASSISTED,WITH SALPINGO-OOPHORECTOMY (Bilateral)  COLPORRHAPHY, COMBINED ANTEROPOSTERIOR (N/A)  PLACEMENT, TRANSOBTURATOR TAPE (N/A)  CYSTOSCOPY (N/A)  LYSIS, ADHESIONS (N/A)    Patient location: PACU    Anesthesia Type: general    Transport from OR: Transported from OR on 100% O2 by closed face mask with adequate spontaneous ventilation    Post pain: adequate analgesia    Post assessment: no apparent anesthetic complications    Post vital signs: stable    Level of consciousness: responds to stimulation    Nausea/Vomiting: no nausea/vomiting    Complications: none    Transfer of care protocol was followed      Last vitals: Visit Vitals  BP (!) 176/106 (BP Location: Right arm, Patient Position: Lying)   Pulse 96   Temp 37 °C (98.6 °F) (Oral)   Resp 18   Ht 5' 5" (1.651 m)   Wt 66.2 kg (146 lb)   SpO2 99%   Breastfeeding No   BMI 24.30 kg/m²     "

## 2024-05-20 NOTE — PROGRESS NOTES
Patient was initially seen postop in PACU.      She was sedated.  However lungs clear abdomen soft.    There was light vaginal spotting of Kotex pad.  Patient had vaginal pack in place.  It was obviously soaked.  However elected not to remove this in PACU.      I obtained a postoperative hematocrit was which was 45% preoperative hematocrit 41%.    She later became more alert and was transferred to 4th floor.    She is now awake surgery has been discussed.  I discussed with both patient and daughter that I was unable to do T OT procedure as previously   Scheduled.  However we were able to perform LAVH with BSO with A&P repair.      She is now awake tolerating sips of clear liquids.  Excellent urine output with clear urine noted.      Lungs are clear abdomen soft    Minimal spotting on pad at present.  A repeat hematocrit has been ordered for 6:00 p.m..    She does complain of perineal pain    blood pressure moderately elevated.  Will give Ultram.  May have IV morphine later if needed.    I will leave Werner and vaginal pack in place at present.  Follow serial hematocrits.      Will probably remove Werner and vaginal pack in the morning.    Nurses  instructed to notify me if problems arise.

## 2024-05-20 NOTE — ANESTHESIA PROCEDURE NOTES
Intubation    Date/Time: 5/20/2024 7:51 AM    Performed by: Flo Barber CRNA  Authorized by: Flo Barber CRNA    Intubation:     Induction:  Intravenous    Intubated:  Postinduction    Mask Ventilation:  Easy mask    Attempts:  1    Attempted By:  CRNA    Method of Intubation:  Direct    Blade:  Sabiha 3    Laryngeal View Grade: Grade I - full view of cords      Difficult Airway Encountered?: No      Complications:  None    Airway Device:  Oral endotracheal tube    Airway Device Size:  7.0    Style/Cuff Inflation:  Cuffed    Inflation Amount (mL):  7    Tube secured:  22    Secured at:  The lips    Placement Verified By:  Capnometry    Complicating Factors:  None    Findings Post-Intubation:  BS equal bilateral and atraumatic/condition of teeth unchanged

## 2024-05-20 NOTE — ANESTHESIA POSTPROCEDURE EVALUATION
Anesthesia Post Evaluation    Patient: Chaya Walls    Procedure(s) Performed: Procedure(s) (LRB):  HYSTERECTOMY,VAGINAL,LAPAROSCOPY-ASSISTED,WITH SALPINGO-OOPHORECTOMY (Bilateral)  COLPORRHAPHY, COMBINED ANTEROPOSTERIOR (N/A)  PLACEMENT, TRANSOBTURATOR TAPE (N/A)  CYSTOSCOPY (N/A)  LYSIS, ADHESIONS (N/A)    Final Anesthesia Type: general      Patient location during evaluation: PACU  Patient participation: Yes- Able to Participate  Level of consciousness: lethargic and awake (patient easily awakened but somewhat lethargic without stimulation, favored to be related to prolonged anesthetic and selection of pain/anti-emetic meds used intra-operatively)  Post-procedure vital signs: reviewed and stable  Pain management: adequate  Airway patency: patent  LEATHA mitigation strategies: Multimodal analgesia  PONV status at discharge: No PONV  Anesthetic complications: no      Cardiovascular status: hemodynamically stable and hypertensive (hypertension in pacu, patient denies pain - treated with labetalol/hydralazine)  Respiratory status: unassisted and spontaneous ventilation  Hydration status: euvolemic  Follow-up not needed.              Vitals Value Taken Time   /82 05/20/24 1510   Temp 37 °C (98.6 °F) 05/20/24 1327   Pulse 82 05/20/24 1510   Resp 11 05/20/24 1505   SpO2 100 % 05/20/24 1510   Vitals shown include unfiled device data.      No case tracking events are documented in the log.      Pain/Michelle Score: Michelle Score: 8 (5/20/2024  2:55 PM)

## 2024-05-20 NOTE — OP NOTE
Dr. Palomino   05/20/2024    Preoperative diagnosis  - third-degree cystocele uterine prolapse stress urinary incontinence second-degree rectocele.    Postoperative diagnosis -significant omental adhesions right upper and lower quadrants probably secondary to previous cholecystectomy    Procedure difficult secondary to small vaginal vault with prolapse.    Procedures LAVH with BSO anterior and posterior vaginal colporrhaphy.  Attempt at T OT procedure however procedure was complicated by trocar penetrating the vaginal mucosa along the left side as the needle exited the  canal.  This was noted as I was closing the midline incision.  Attempted to mobilize the vaginal mucosa and cover this area of mesh however procedure was difficult with difficult exposure in this small vaginal introitus.  Therefore ultimately I decided to abort attempts at placing the T OT mesh.  It was removed.      I proceeded with the vaginal hysterectomy along with removal of both ovaries and fallopian tubes and proceeded with anterior and posterior vaginal colporrhaphy.      How the procedures were performed    Patient was placed under general anesthetic by anesthesiologist.  She is prepped draped usual manner for abdominal and vaginal surgery.  Bladder was drained with a Werner.  Sponge stick was placed in the vaginal   By nurse.      Time-out was called.  Patient identified procedure identified.  She was given 2 g of Ancef preoperatively.      I elected to proceed with initially laparoscopic examination and release of each infundibulopelvic ligament and vessels leading to the ovaries.      2-1/2 cm incision made  at the lower end of the umbilicus.  Fascia was lifted up with Allis clamps and abdomen carefully entered by sharp dissection with Nielsen's.  Blunt type laparoscopic trocar instrument was inserted under direct visualization.  It was secured by inflating bulb below the fascia.  The abdomen was insufflated with CO2.  Scope and placed  through trocar.  Immediately it was noted that there was significant omental adhesions from the right upper quadrant down the  right side of the anterior abdominal wall to the right adnexa.      However under direct visualization I was  able to place 5 mm ports suprapubically and in the right lateral quadrant.    Through the suprapubic port I placed a ratchet type instrument.  I begin manipulating the omental adhesions and using a Alma Rosa bipolar instrument through the right mid quadrant port to begin grasping cauterizing and transecting omental adhesions.  This was done from a proximally the midportion of the abdomen down to the right adnexa.  I then released further adhesions from the right ovary and swept  omentum  cephalad.  This allowed me to see the right ovary.  It was lifted upward with ratchet instrument.  The infundibulopelvic ligament and vessels were isolated clamped with Alma Rosa instrument cauterized and transected.  I continued the transection up to the cornual aspect of the uterus.      A similar procedure was done on patient's left side.    At this time  I made an incision into the upper aspect of the bladder flap along the lower uterine segment with laparoscopic scissors.    At this time attention was turned vaginally.      Right angle retractors were used along with Morales Bhagat retractor for adequate visualization.  I elected to begin by placing the T OT tape in usual manner.   Allis clamps were applied to the subcu urethral mucosa a proximally  1 cm below the urethral os.  A 2nd Allis clamps was applied proximally 2 cm cephalad.  The vaginal mucosa was tented upward and a shallow incision was made with scalpel.  Allis clamps were then applied laterally to the vaginal mucosa.  I then begin a blunt and sharp dissection with Nielsen scissors laterally.  Bluntly I was able to place a finger just beneath the pubic rami on each side.    Thereafter small incisions were made just lateral to the labia majora  at the level of the clitoris.  I could easily feel the obturator canal.    I begin on the patient's right.  Using my left hand I guided the [employer] needle through the skin incision and down through the right obturator canalUsing my index finger of my right hand I immediately felt the needle come beneath the pubic rami.  I guided it out in the periurethral space.      The T OT tape was placed on the needle and retracted back through.      I then performed a similar procedure on the patient's right.  Using my left index finger I placed it in the periurethral space just beneath the pubic rami.  Using my right hand the needle was placed through the obturator canal and immediately I could feel the needle coming beneath   Pubic rami as it exited the  canal.   The other end of the mesh was attached to the needle and it was retracted back through.  I centered the mesh with centering tab.  Centering tab was removed.  Using a 8. Hegar dilator I placed this between the mesh and the urethra.  I carefully removed both cellophane coverings of the mesh sleeve.  Careful attention was made that there was no tension on the urethra.  After placement  the sling appeared to be in good position.   After each sling was placed cystoscopic exams were done to rule out any bladder injury.     I then begin closing the midline incision with number 2-0 Vicryl suture.  However at that time I noted that in the lateral portion of the  vaginal fold there had been a perforation of the needle at the apex with exposure of the sling.  Therefore I begin to try to mobilize the vaginal mucosa and so over this exposed mesh.  However exposure was difficult secondary to small vaginal introitus   Finally I made the decision that patient definitely may develop an erosion of the mesh in this area and elected to remove the mesh by simply pulling it out vaginally through the subcu urethral incision.    I then closed the vaginal mucosa with  interrupted number 2-0 Vicryl sutures.  The lateral mucosal puncture site was also closed with a figure-of-eight 0 Vicryl stitch.      I then proceeded with vaginal hysterectomy.  A circumferential incision made about the distal aspect of the cervix.  The vaginal mucosa was  from cervix by blunt and sharp dissection.  A posterior colpotomy was made.  Thereafter Jaimie clamps were applied to each uterosacral ligament resected with Nielsen's and ligated with 0 Vicryl stick ties which were tagged.    Attention was then turned and anteriorly.  Bites were taken with Jaimie clamps at 3 and 9:00 a.m. resected with Nielsen's and ligated with 0. Vicryl stick ties.  I then dissected sharply and bluntly beneath the bladder flap until the  abdomen was entered through the anterior colpotomy incision.  Right angle retractor was inserted thus elevating the bladder.  Successive bites were then taken along the broad ligament with Jaimie clamps resection with Nielsen scissors and ligation with 0. Vicryl stick ties.  The utero-ovarian pedicle and round ligaments were taken in a single bite resected with Nielsen's thus releasing the uterus on both sides.   Pedicle was then ligated with 0. Vicryl stick ties.    Each ovary was then swept downward with Amoret and Jaimie clamps was applied to the remaining connective tissue resected with Nielsen's and ligated with 0.  Vicryl free tie.  Each ovary was passed to circulator.  The posterior vaginal cuff was then run with a running lichen a 0 Vicryl suture.  Thereafter peritoneum was reapproximated with a pursestring stitch of 2-0 Vicryl beginning anteriorly and placing pursestring stitch around the lateral and posterior aspect of the peritoneum it was since downward and tied together.      This time I proceeded with anterior vaginal colporrhaphy.  Allis clamps were applied to the vaginal cuff anteriorly.  I begin anteriorly by sharply  making incision into the midline of the vaginal cuff with Morales  scissors.  Allis clamps were applied laterally.  By blunt and sharp dissection push bladder upward.  Using Ray-Feliz sponges the bladder was pushed upward.  By further sharp dissection laterally bladder was freed from the anterior vaginal mucosa.      When adequate exposure had been obtained 0 Vicryl Padmini plication sutures were placed laterally.  A total of 4 sutures were placed.  These were then tied in succession pulling the  connective tissue together and providing good support for previous cystocele.  This was done from a posterior to anterior portion of the vaginal cuff.  Excess vaginal mucosa was then trimmed away.  The vaginal mucosa was then repaired using a 0 Vicryl stitch along the uterosacral ligaments and closing the anterior colpotomy with interrupted 2-0 Vicryl sutures.  Vaginal cuff was completely closed.      Attention was then turned posteriorly.  A triangular wedge of tissue was taken with the posterior vaginal opening.  Allis clamps were applied laterally.  Vertical midline incision was made with Metzenbaum an Allis clamps were applied laterally.  By blunt and sharp dissection the rectocele was exposed.  Using Raytec sponges placed in the  posterior compartment the rectocele was pushed upward.  Thereafter 0 Vicryl sutures were placed in the pelvic floor musculature laterally.  Total of 4 sutures were placed.  These were then tied in succession after the Raytec sponges were removed.  This pull the pelvic floor musculature together providing good support for previous rectocele.  Excess vaginal mucosa was trimmed away posterior vaginal mucosa was then reapproximated with running and locking number 2-0 Vicryl suture.    Thereafter the vaginal vault was inspected good hemostasis noted vaginal pack inserted.    I then performed a cystoscopic examination.  There was no evidence of bladder injury efflux of yellow orange urine was noted through each ureteral orifice.    Werner catheter was reinserted.       Attention was then turned abdominally physician re gloved.  The abdomen was insufflated with CO2.  Pelvis was thoroughly irrigated and suction with saline.  Good hemostasis was noted along the vaginal cuff.  All instruments were removed CO2 allowed to escape.  The infraumbilical fascial incision was closed using interrupted 0. Vicryl suture.  Skin was reapproximated with  interrupted number 4-0 Vicryl subcuticular stitches.      Estimated blood loss was 150 cc all needle instruments and sponge counts were reported as correct.  She was returned to cover room in good condition.

## 2024-05-20 NOTE — ANESTHESIA PREPROCEDURE EVALUATION
05/20/2024  Chaya Walls is a 68 y.o., female.      Pre-op Assessment    I have reviewed the Patient Summary Reports.     I have reviewed the Nursing Notes. I have reviewed the NPO Status.   I have reviewed the Medications.     Review of Systems  Anesthesia Hx:  No problems with previous Anesthesia             Denies Family Hx of Anesthesia complications.   Personal Hx of Anesthesia complications, Post-Operative Nausea/Vomiting                    Social:  Non-Smoker, No Alcohol Use       Cardiovascular:  Exercise tolerance: good   Hypertension              ECG has been reviewed.                          Musculoskeletal:  Arthritis               OB/GYN/PEDS:   Cystocele with third degree uterine prolapse [N81.3]       Stress incontinence in female [N39.3]                  Physical Exam  General: Well nourished, Cooperative and Alert    Airway:  Mallampati: II   Mouth Opening: Normal  TM Distance: Normal  Tongue: Normal  Neck ROM: Normal ROM    Dental:  Intact    Chest/Lungs:  Clear to auscultation, Normal Respiratory Rate    Heart:  Rate: Normal  Rhythm: Regular Rhythm        Chemistry        Component Value Date/Time     05/14/2024 0718    K 4.5 05/14/2024 0718     (H) 05/14/2024 0718    CO2 26 05/14/2024 0718    BUN 18 05/14/2024 0718    CREATININE 0.97 05/14/2024 0718     05/14/2024 0718        Component Value Date/Time    CALCIUM 8.8 05/14/2024 0718    ALKPHOS 69 05/14/2024 0718    AST 24 05/14/2024 0718    ALT 20 05/14/2024 0718    BILITOT 0.3 05/14/2024 0718    EGFRNONAA 72 07/19/2021 0842        Lab Results   Component Value Date    WBC 5.46 05/14/2024    HGB 13.3 05/14/2024    HCT 41.2 05/14/2024     05/14/2024     No results found for this or any previous visit.      Anesthesia Plan  Type of Anesthesia, risks & benefits discussed:    Anesthesia Type: Gen ETT  Intra-op  Monitoring Plan: Standard ASA Monitors  Post Op Pain Control Plan: multimodal analgesia  Induction:  IV  Airway Plan: Direct, Post-Induction  Informed Consent: Informed consent signed with the Patient and all parties understand the risks and agree with anesthesia plan.  All questions answered.   ASA Score: 2  Day of Surgery Review of History & Physical: H&P Update referred to the surgeon/provider.I have interviewed and examined the patient. I have reviewed the patient's H&P dated: There are no significant changes.     Ready For Surgery From Anesthesia Perspective.     .

## 2024-05-20 NOTE — BRIEF OP NOTE
Brief op note    Patient had laparoscopically assisted vaginal hysterectomy with bilateral salpingo-oophorectomy.     There were significant adhesions noted of the omentum to the anterior abdominal wall on laparoscopic exam.  These were released.    Thereafter  I attempted a T OT procedure.  However the mesh could not be placed because of difficulty placing mesh.   Therefore T OT procedure was aborted.    I proceeded with LAVH with removal of both ovaries and fallopian tubes along with anterior and posterior vaginal colporrhaphy.      Postoperatively cystoscopy revealed no evidence of bladder injury efflux of yellow orange urine through each ureteral orifice.    Estimated blood loss 150 cc.      All needle instruments and sponge counts reported as correct.    See operative report for complete details.

## 2024-05-20 NOTE — OR NURSING
1323 Rec'd pt to PACU asleep, respirations shallow. Jaw thrust maneuver performed to maintain airway. 9cm oral airway placed. Abd lap sites x4 C/D/I, mehnaz pad C/D/I. Werner to gravity patent, secure, intact with no kinks or obstructions noted. No needs. Will continue to monitor.     1344 Oral airway removed, respirations even and unlabored. Reoriented to surroundings. No needs.     1356 /103. Dr. Grullon notified.     1408 Rec'd orders per Dr. Grullon to give 10 mg labetalol IV. See MAR for admin.     1425 /108. Dr. Grullon notified. Rec'd orders to give hydralazine 10mg IV. See MAR for admin.     1435 Dr. Palomino at bedside. Assessed vaginal packing, small-moderate amount of blood noted to mehnaz pad. Rec'd orders for stat H&H, states to call with results before taking pt to floor. Lab called at this time.     1500 Rec'd orders per Dr. Grullon to give additional 10mg labetalol IV, see MAR for admin.     1510 H&H 14.5 & 14.9. Dr. Palomino notified. States ok to go to floor.     1522 Dr. Palomino at bedside to assess vaginal packing. States ok to return to floor. Out of PACU. VSS. No signs of bleeding/distress noted. Family notified of transfer to room.     1530 Pt to room 458 drowsy but arousable to verbal stimuli. No signs of distress noted, respirations even and unlabored. Family at bedside with pt belongings. Bedside report given to KALYAN Gray RN. Moved pt to regular bed with safety precautions in place. Abd lap sites x4 C/D/I, small-moderate amount of blood noted to mehnaz pad. Werner to gravity patent, secure, intact. Denies pain/needs. /91, P 90, R 12, O2 96% RA, T 97.5 oral.

## 2024-05-21 LAB
BASOPHILS # BLD AUTO: 0.02 K/UL (ref 0–0.2)
BASOPHILS NFR BLD AUTO: 0.2 % (ref 0–1)
DIFFERENTIAL METHOD BLD: ABNORMAL
EOSINOPHIL # BLD AUTO: 0 K/UL (ref 0–0.5)
EOSINOPHIL NFR BLD AUTO: 0 % (ref 1–4)
ERYTHROCYTE [DISTWIDTH] IN BLOOD BY AUTOMATED COUNT: 12.7 % (ref 11.5–14.5)
ESTROGEN SERPL-MCNC: NORMAL PG/ML
ESTROGEN SERPL-MCNC: NORMAL PG/ML
HCT VFR BLD AUTO: 37.2 % (ref 38–47)
HCT VFR BLD AUTO: 37.8 % (ref 38–47)
HCT VFR BLD AUTO: 38.5 % (ref 38–47)
HCT VFR BLD AUTO: 38.6 % (ref 38–47)
HGB BLD-MCNC: 12 G/DL (ref 12–16)
HGB BLD-MCNC: 12.1 G/DL (ref 12–16)
HGB BLD-MCNC: 12.4 G/DL (ref 12–16)
HGB BLD-MCNC: 12.6 G/DL (ref 12–16)
IMM GRANULOCYTES # BLD AUTO: 0.07 K/UL (ref 0–0.04)
IMM GRANULOCYTES NFR BLD: 0.5 % (ref 0–0.4)
INSULIN SERPL-ACNC: NORMAL U[IU]/ML
INSULIN SERPL-ACNC: NORMAL U[IU]/ML
LAB AP GROSS DESCRIPTION: NORMAL
LAB AP GROSS DESCRIPTION: NORMAL
LAB AP LABORATORY NOTES: NORMAL
LAB AP LABORATORY NOTES: NORMAL
LYMPHOCYTES # BLD AUTO: 1.48 K/UL (ref 1–4.8)
LYMPHOCYTES NFR BLD AUTO: 11.6 % (ref 27–41)
MCH RBC QN AUTO: 28.9 PG (ref 27–31)
MCHC RBC AUTO-ENTMCNC: 32.5 G/DL (ref 32–36)
MCV RBC AUTO: 88.8 FL (ref 80–96)
MONOCYTES # BLD AUTO: 1.06 K/UL (ref 0–0.8)
MONOCYTES NFR BLD AUTO: 8.3 % (ref 2–6)
MPC BLD CALC-MCNC: 10.8 FL (ref 9.4–12.4)
NEUTROPHILS # BLD AUTO: 10.11 K/UL (ref 1.8–7.7)
NEUTROPHILS NFR BLD AUTO: 79.4 % (ref 53–65)
NRBC # BLD AUTO: 0 X10E3/UL
NRBC, AUTO (.00): 0 %
PLATELET # BLD AUTO: 252 K/UL (ref 150–400)
RBC # BLD AUTO: 4.19 M/UL (ref 4.2–5.4)
T3RU NFR SERPL: NORMAL %
T3RU NFR SERPL: NORMAL %
WBC # BLD AUTO: 12.74 K/UL (ref 4.5–11)

## 2024-05-21 PROCEDURE — 36415 COLL VENOUS BLD VENIPUNCTURE: CPT | Performed by: OBSTETRICS & GYNECOLOGY

## 2024-05-21 PROCEDURE — 85014 HEMATOCRIT: CPT | Mod: 91 | Performed by: OBSTETRICS & GYNECOLOGY

## 2024-05-21 PROCEDURE — 94761 N-INVAS EAR/PLS OXIMETRY MLT: CPT

## 2024-05-21 PROCEDURE — 85025 COMPLETE CBC W/AUTO DIFF WBC: CPT | Performed by: OBSTETRICS & GYNECOLOGY

## 2024-05-21 PROCEDURE — 99900035 HC TECH TIME PER 15 MIN (STAT)

## 2024-05-21 PROCEDURE — 25000003 PHARM REV CODE 250: Performed by: OBSTETRICS & GYNECOLOGY

## 2024-05-21 PROCEDURE — 63600175 PHARM REV CODE 636 W HCPCS: Performed by: OBSTETRICS & GYNECOLOGY

## 2024-05-21 RX ORDER — NITROFURANTOIN 25; 75 MG/1; MG/1
100 CAPSULE ORAL EVERY 12 HOURS
Status: DISCONTINUED | OUTPATIENT
Start: 2024-05-21 | End: 2024-05-23 | Stop reason: HOSPADM

## 2024-05-21 RX ORDER — SODIUM CHLORIDE 9 MG/ML
INJECTION, SOLUTION INTRAVENOUS
Status: DISCONTINUED | OUTPATIENT
Start: 2024-05-21 | End: 2024-05-23 | Stop reason: HOSPADM

## 2024-05-21 RX ADMIN — SODIUM CHLORIDE, POTASSIUM CHLORIDE, SODIUM LACTATE AND CALCIUM CHLORIDE: 600; 310; 30; 20 INJECTION, SOLUTION INTRAVENOUS at 02:05

## 2024-05-21 RX ADMIN — MORPHINE SULFATE 4 MG: 4 INJECTION INTRAVENOUS at 05:05

## 2024-05-21 RX ADMIN — TRAMADOL HYDROCHLORIDE 50 MG: 50 TABLET, COATED ORAL at 02:05

## 2024-05-21 RX ADMIN — SODIUM CHLORIDE 10 ML: 9 INJECTION, SOLUTION INTRAVENOUS at 01:05

## 2024-05-21 RX ADMIN — DEXTROSE MONOHYDRATE 1 G: 5 INJECTION INTRAVENOUS at 01:05

## 2024-05-21 RX ADMIN — NITROFURANTOIN 100 MG: 25; 75 CAPSULE ORAL at 08:05

## 2024-05-21 RX ADMIN — MUPIROCIN: 20 OINTMENT TOPICAL at 08:05

## 2024-05-21 RX ADMIN — IBUPROFEN 600 MG: 600 TABLET, FILM COATED ORAL at 08:05

## 2024-05-21 RX ADMIN — FAMOTIDINE 20 MG: 20 TABLET, FILM COATED ORAL at 08:05

## 2024-05-21 RX ADMIN — TRAMADOL HYDROCHLORIDE 50 MG: 50 TABLET, COATED ORAL at 03:05

## 2024-05-21 NOTE — PROGRESS NOTES
Postoperative day 1    Patient alert this morning.  Tolerating liquids.    She is having lower pelvic pain to be expected after anterior and posterior vaginal colporrhaphy with LAVH.    Lungs are clear abdomen soft bowel sounds present    Hematocrit 37% with hemoglobin of 12    Excellent urine output with clear urine noted.    Examination was done with nurse in room.  Vaginal pack was removed.   Vaginal pack revealed only light sanguinous fluid.  Not saturated.       Digital exam of vaginal vault reveals good support present.Rectal exam also revealed good support posteriorly no evidence of sutures within the rectum on examination.    Plan    Discussed with patient and  nurse    Discussed changing to INT line    Full liquid diet.    Encouraged ambulation in room.  Encouraged showering with assistance proximally 10 or 11:00 a.m. this morning.    Discussed measuring voids.  Discussed with patient and nurse catheterization p.r.n. for urinary retention

## 2024-05-21 NOTE — NURSING
Sn notified Dr. Palomino of Poole cath placed due to increased bladder distention, increased pain and 675 cc of urine received.  New orders received to keep poole in and dc at 0600 in the morning and resume voiding trial at that time.

## 2024-05-21 NOTE — PROGRESS NOTES
Recent catheterizations by nurse revealed a  675 cc of urine.      Werner was left in placed.      I have discussed that we will allow bladder to obtain tone tonight by continuous drainage and remove Werner at approximately 6:00 a.m. in the morning.    Progress diet as tolerated.    I have begun Macrobid 100 mg p.o. b.I.d. for prophylaxis since she is having intermittent caths.    We will continue to follow voiding pattern.

## 2024-05-21 NOTE — PROGRESS NOTES
Patient comfortable at present    Much were alert.  No specific complaints.  Excellent urine output with clear urine noted.      Lungs are clear    Abdomen soft minimal bowel sounds however normal postoperative incisional tenderness.    No vomiting.    Light spotting of   Perineal pad     Discussed with patient and daughter hematocrit at 6:26 p.m. 42.9% with a hemoglobin of 13.7.    Blood pressure has been moderately elevated with pulse of a proximally .    Patient usually takes Inderal 80 mg nightly.    Since hematocrit shows that she is hemodynamically stable ,I allowed to take her home med of 80 mg of Inderal while I was in the room.    Plan        She is she will continue Ultram  q.4 hoursp.r.n. for pain.  Morphine is ordered if needed.    I plan to remove vaginal pack tomorrow.    Another early morning hematocrit is to be drawn.    I have also ordered additional Ancef prophylactic coverage.

## 2024-05-21 NOTE — NURSING
Dr Palomino to patient room. Checked post-op bleeding via maxi -pad. Instructed patient to taken her own Propanolol 80mg due to stating patients blood pressure elevated

## 2024-05-21 NOTE — PROGRESS NOTES
Postoperative day 1    Seen earlier this morning.    Has done well with full liquid diet.      Ambulating in room.  Showered earlier to day.    However she has been unable to void thus far.  Nurse catheterized her earlier got only 30 cc.  However patient is uncomfortable.    Therefore I decided to perform a cath urinalysis myself.  Urethra was prepped with Betadine she was catheterized and 500 cc of clear urine was obtained.  Patient got immediate relief.    Discussed continuing to follow voiding pattern.    If she does not void well by this evening I will place a Werner catheter to bag overnight to allow more tone in the bladder.      Patient assured that this is not uncommon following extensive anterior and posterior vaginal colporrhaphy.

## 2024-05-22 LAB
HCT VFR BLD AUTO: 36.1 % (ref 38–47)
HCT VFR BLD AUTO: 37.2 % (ref 38–47)
HCT VFR BLD AUTO: 37.5 % (ref 38–47)
HCT VFR BLD AUTO: 37.8 % (ref 38–47)
HGB BLD-MCNC: 11.6 G/DL (ref 12–16)
HGB BLD-MCNC: 12 G/DL (ref 12–16)
HGB BLD-MCNC: 12.1 G/DL (ref 12–16)
HGB BLD-MCNC: 12.1 G/DL (ref 12–16)

## 2024-05-22 PROCEDURE — 25000003 PHARM REV CODE 250: Performed by: OBSTETRICS & GYNECOLOGY

## 2024-05-22 PROCEDURE — 36415 COLL VENOUS BLD VENIPUNCTURE: CPT | Performed by: OBSTETRICS & GYNECOLOGY

## 2024-05-22 PROCEDURE — 85018 HEMOGLOBIN: CPT | Mod: 91 | Performed by: OBSTETRICS & GYNECOLOGY

## 2024-05-22 PROCEDURE — 99900035 HC TECH TIME PER 15 MIN (STAT)

## 2024-05-22 PROCEDURE — 85014 HEMATOCRIT: CPT | Performed by: OBSTETRICS & GYNECOLOGY

## 2024-05-22 PROCEDURE — 94761 N-INVAS EAR/PLS OXIMETRY MLT: CPT

## 2024-05-22 RX ADMIN — IBUPROFEN 600 MG: 600 TABLET, FILM COATED ORAL at 01:05

## 2024-05-22 RX ADMIN — IBUPROFEN 600 MG: 600 TABLET, FILM COATED ORAL at 11:05

## 2024-05-22 RX ADMIN — NITROFURANTOIN 100 MG: 25; 75 CAPSULE ORAL at 08:05

## 2024-05-22 RX ADMIN — MUPIROCIN: 20 OINTMENT TOPICAL at 08:05

## 2024-05-22 RX ADMIN — FAMOTIDINE 20 MG: 20 TABLET, FILM COATED ORAL at 08:05

## 2024-05-22 RX ADMIN — IBUPROFEN 600 MG: 600 TABLET, FILM COATED ORAL at 05:05

## 2024-05-22 NOTE — PROGRESS NOTES
2nd postoperative day    Late yesterday afternoon nurse catheterized patient and noted 675 cc.  At my direction Werner catheter was left in overnight.       Werner was removed 6:00 a.m. this morning.      She has been tolerating full liquids well.  We will advanced to regular diet this morning.      Her lungs are clear bowel sounds active this morning.    She is much more comfortable this morning.  She is actually ambulated in the halls.      We have discussed regular diet this morning ambulate  as desired.  We discussed showering later this morning and hopefully voiding in the shower.      We discussed if further voids occur she will record volume.      I have discussed with nurse this morning that if patient becomes uncomfortable to cath p.r.n. for urinary retention.      Plan    Follow voiding pattern    Advanced to regular diet

## 2024-05-22 NOTE — PROGRESS NOTES
Patient was rechecked approximately 11:30 a.m..  She was found ambulating in the halls.      Stated she would voided small amount.  However she was not uncomfortable.      She was allowed to void again.  She had a total of proximally 50 cc of urine.      I then personally re prepped and catheterized her.  She had only 125 cc of urine.      We discussed continuing frequent voids throughout the day.  We will continue following voiding pattern.

## 2024-05-22 NOTE — NURSING
Pt took home propranolol 80mg for BP. Ok for pt to take per Dr Palomino order.   Recommendations (Free Text): Patient advised to see an allergist. Detail Level: Zone

## 2024-05-22 NOTE — PROGRESS NOTES
Throughout afternoon she has voided at least 5 times.      Volumes have ranged from 50 cc to 200 cc per void.      She is comfortable sitting up in chair at present.  No complaints.      I have discussed with nurse to have night shift nurse perform postvoid residual between 8 and 9:00 a.m..  If residual is greater than 50 cc catheter to be left in place.    Continue regular diet.    Will probably ready for discharge home tomorrow.

## 2024-05-23 VITALS
HEART RATE: 72 BPM | DIASTOLIC BLOOD PRESSURE: 75 MMHG | BODY MASS INDEX: 24.32 KG/M2 | TEMPERATURE: 98 F | SYSTOLIC BLOOD PRESSURE: 130 MMHG | RESPIRATION RATE: 18 BRPM | WEIGHT: 146 LBS | OXYGEN SATURATION: 96 % | HEIGHT: 65 IN

## 2024-05-23 DIAGNOSIS — G89.18 POSTOPERATIVE PAIN: Primary | ICD-10-CM

## 2024-05-23 PROBLEM — N81.3 CYSTOCELE WITH THIRD DEGREE UTERINE PROLAPSE: Status: ACTIVE | Noted: 2024-05-23

## 2024-05-23 LAB
CREAT SERPL-MCNC: 1.14 MG/DL (ref 0.55–1.02)
EGFR (NO RACE VARIABLE) (RUSH/TITUS): 53 ML/MIN/1.73M2
HCT VFR BLD AUTO: 35.3 % (ref 38–47)
HCT VFR BLD AUTO: 35.7 % (ref 38–47)
HCT VFR BLD AUTO: 36.8 % (ref 38–47)
HGB BLD-MCNC: 11.3 G/DL (ref 12–16)
HGB BLD-MCNC: 11.3 G/DL (ref 12–16)
HGB BLD-MCNC: 11.5 G/DL (ref 12–16)

## 2024-05-23 PROCEDURE — 85014 HEMATOCRIT: CPT | Mod: 91 | Performed by: OBSTETRICS & GYNECOLOGY

## 2024-05-23 PROCEDURE — 25000003 PHARM REV CODE 250: Performed by: OBSTETRICS & GYNECOLOGY

## 2024-05-23 PROCEDURE — 82565 ASSAY OF CREATININE: CPT | Performed by: OBSTETRICS & GYNECOLOGY

## 2024-05-23 PROCEDURE — 36415 COLL VENOUS BLD VENIPUNCTURE: CPT | Performed by: OBSTETRICS & GYNECOLOGY

## 2024-05-23 PROCEDURE — 85018 HEMOGLOBIN: CPT | Performed by: OBSTETRICS & GYNECOLOGY

## 2024-05-23 PROCEDURE — 27000758 HC SPIROMETER

## 2024-05-23 RX ORDER — TRAMADOL HYDROCHLORIDE 50 MG/1
50 TABLET ORAL EVERY 6 HOURS PRN
Qty: 10 TABLET | Refills: 0 | Status: SHIPPED | OUTPATIENT
Start: 2024-05-23

## 2024-05-23 RX ORDER — FAMOTIDINE 20 MG/1
20 TABLET, FILM COATED ORAL DAILY
Status: DISCONTINUED | OUTPATIENT
Start: 2024-05-24 | End: 2024-05-23 | Stop reason: HOSPADM

## 2024-05-23 RX ADMIN — BISACODYL 10 MG: 10 SUPPOSITORY RECTAL at 08:05

## 2024-05-23 RX ADMIN — FAMOTIDINE 20 MG: 20 TABLET, FILM COATED ORAL at 08:05

## 2024-05-23 RX ADMIN — IBUPROFEN 600 MG: 600 TABLET, FILM COATED ORAL at 11:05

## 2024-05-23 RX ADMIN — NITROFURANTOIN 100 MG: 25; 75 CAPSULE ORAL at 08:05

## 2024-05-23 RX ADMIN — IBUPROFEN 600 MG: 600 TABLET, FILM COATED ORAL at 06:05

## 2024-05-23 RX ADMIN — BISACODYL 10 MG: 10 SUPPOSITORY RECTAL at 11:05

## 2024-05-23 NOTE — DISCHARGE INSTRUCTIONS
Patient and daughter was saw shown how to  perform self catheterize examination.     Cath p.r.n. for urinary retention.     She will be discharged home instructed have follow-up with June 3rd.     Instructed to return to the emergency room if any chills or fever.  Persistent vomiting or heavy vaginal bleeding.  Otherwise she will have follow-up in my office as directed          Pelvic Rest. NO intercourse, no douching, no sitting in water, no tampons.  No lifting more than 10 lbs,  No driving, operating heavy equipment, or signing an legal documents.  Report excessive bleeding to Your physician immediately.  Take meds as directed.  Call your physician for and complaints or concerns.

## 2024-05-23 NOTE — NURSING
Patient discharged by wheelchair with daughter to vehicle at this time. No acute signs or symptoms of distress noted.

## 2024-05-23 NOTE — PROGRESS NOTES
Upon recheck at 1:40 p.m. patient stated she would voided 200 cc a proximally  40 minutes ago.      The nurse performed a bladder scan but could not see any significant residual.      However I decided to show both daughter and patient how to perform self catheterization if needed at home.  125 cc of urine was obtained however she voided a proximally 40 minutes ago.  Therefore I consider this within acceptable limits.      She has had further flatus after Dulcolax suppository this morning.      On exam abdomen is soft bowel sounds are active.    Patient and daughter was saw shown how to  perform self catheterize examination.    Cath p.r.n. for urinary retention.    She will be discharged home instructed have follow-up with June 3rd.    Instructed to return to the emergency room if any chills or fever.  Persistent vomiting or heavy vaginal bleeding.  Otherwise she will have follow-up in my office as directed   Plan as above.
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
## 2024-05-23 NOTE — PLAN OF CARE
Problem: Adult Inpatient Plan of Care  Goal: Plan of Care Review  Outcome: Progressing  Goal: Patient-Specific Goal (Individualized)  Outcome: Progressing  Goal: Absence of Hospital-Acquired Illness or Injury  Outcome: Progressing  Goal: Optimal Comfort and Wellbeing  Outcome: Progressing  Goal: Readiness for Transition of Care  Outcome: Progressing     Problem: Infection  Goal: Absence of Infection Signs and Symptoms  Outcome: Progressing     Problem: Wound  Goal: Optimal Coping  Outcome: Progressing  Goal: Optimal Functional Ability  Outcome: Progressing  Goal: Absence of Infection Signs and Symptoms  Outcome: Progressing  Goal: Improved Oral Intake  Outcome: Progressing  Goal: Optimal Pain Control and Function  Outcome: Progressing  Goal: Skin Health and Integrity  Outcome: Progressing  Goal: Optimal Wound Healing  Outcome: Progressing     Problem: Wound  Goal: Optimal Coping  Outcome: Progressing  Goal: Optimal Functional Ability  Outcome: Progressing  Goal: Absence of Infection Signs and Symptoms  Outcome: Progressing  Goal: Improved Oral Intake  Outcome: Progressing  Goal: Optimal Pain Control and Function  Outcome: Progressing  Goal: Skin Health and Integrity  Outcome: Progressing  Goal: Optimal Wound Healing  Outcome: Progressing

## 2024-05-23 NOTE — NURSING
Discharge instructions reviewed with patient and spouse; and copy given to patient. Patient and spouse voiced understanding regarding:meds, appt., signs and symptoms to report to physician.     Patient and daughter was saw shown how to  perform self catheterize examination.     Cath p.r.n. for urinary retention.     She will be discharged home instructed have follow-up with June 3rd.     Instructed to return to the emergency room if any chills or fever.  Persistent vomiting or heavy vaginal bleeding.  Otherwise she will have follow-up in my office as directed          Pelvic Rest. NO intercourse, no douching, no sitting in water, no tampons.  No lifting more than 10 lbs,  No driving, operating heavy equipment, or signing an legal documents.  Report excessive bleeding to Your physician immediately.  Take meds as directed.  Call your physician for and complaints or concerns.

## 2024-06-04 ENCOUNTER — OFFICE VISIT (OUTPATIENT)
Dept: OBSTETRICS AND GYNECOLOGY | Facility: CLINIC | Age: 69
End: 2024-06-04
Payer: MEDICARE

## 2024-06-04 VITALS — DIASTOLIC BLOOD PRESSURE: 80 MMHG | SYSTOLIC BLOOD PRESSURE: 148 MMHG

## 2024-06-04 DIAGNOSIS — Z09 POSTOP CHECK: Primary | ICD-10-CM

## 2024-06-04 PROCEDURE — 1101F PT FALLS ASSESS-DOCD LE1/YR: CPT | Mod: CPTII,,, | Performed by: OBSTETRICS & GYNECOLOGY

## 2024-06-04 PROCEDURE — 1126F AMNT PAIN NOTED NONE PRSNT: CPT | Mod: CPTII,,, | Performed by: OBSTETRICS & GYNECOLOGY

## 2024-06-04 PROCEDURE — 3079F DIAST BP 80-89 MM HG: CPT | Mod: CPTII,,, | Performed by: OBSTETRICS & GYNECOLOGY

## 2024-06-04 PROCEDURE — 99213 OFFICE O/P EST LOW 20 MIN: CPT | Mod: PBBFAC | Performed by: OBSTETRICS & GYNECOLOGY

## 2024-06-04 PROCEDURE — 3077F SYST BP >= 140 MM HG: CPT | Mod: CPTII,,, | Performed by: OBSTETRICS & GYNECOLOGY

## 2024-06-04 PROCEDURE — 3288F FALL RISK ASSESSMENT DOCD: CPT | Mod: CPTII,,, | Performed by: OBSTETRICS & GYNECOLOGY

## 2024-06-04 PROCEDURE — 99024 POSTOP FOLLOW-UP VISIT: CPT | Mod: ,,, | Performed by: OBSTETRICS & GYNECOLOGY

## 2024-06-04 NOTE — PATIENT INSTRUCTIONS
Discussed gradually increasing activity    Follow-up with me in another 3 weeks.    Pathology reviewed all benign findings.

## 2024-06-04 NOTE — PROGRESS NOTES
Subjective:       Patient ID: Chaya Walls is a 68 y.o. female.    Chief Complaint: Post-op Evaluation (Here for 2 week post op check after LAVH/BSO, A&P REPAIR. )     Presents 2 weeks post laparoscopic-assisted vaginal hysterectomy bilateral salpingo-oophorectomy with anterior and posterior vaginal colporrhaphy.    She is now voiding well.    Incidentally she has having no further significant incontinence.          Review of Systems      Objective:      Physical Exam  Genitourinary:     Comments: External genitalia normal to appearance.  Limited speculum examination revealed vaginal incisions healing well.  With digital exam slight adhesions of the anterior colporrhaphy 2 posterior colporrhaphy were reduced.  Lubrication applied.  Discussed light bleeding.  However bimanual exam revealed excellent support anteriorly and posteriorly.        Assessment:       1. Postop check        Plan:       Patient Instructions     Discussed gradually increasing activity    Follow-up with me in another 3 weeks.    Pathology reviewed all benign findings.

## 2024-06-25 ENCOUNTER — OFFICE VISIT (OUTPATIENT)
Dept: OBSTETRICS AND GYNECOLOGY | Facility: CLINIC | Age: 69
End: 2024-06-25
Payer: MEDICARE

## 2024-06-25 VITALS
WEIGHT: 150 LBS | HEART RATE: 79 BPM | BODY MASS INDEX: 24.99 KG/M2 | DIASTOLIC BLOOD PRESSURE: 82 MMHG | HEIGHT: 65 IN | SYSTOLIC BLOOD PRESSURE: 142 MMHG

## 2024-06-25 DIAGNOSIS — Z09 POSTOP CHECK: Primary | ICD-10-CM

## 2024-06-25 DIAGNOSIS — N95.2 ATROPHIC VAGINITIS: ICD-10-CM

## 2024-06-25 PROCEDURE — 3079F DIAST BP 80-89 MM HG: CPT | Mod: CPTII,,, | Performed by: OBSTETRICS & GYNECOLOGY

## 2024-06-25 PROCEDURE — 99213 OFFICE O/P EST LOW 20 MIN: CPT | Mod: PBBFAC | Performed by: OBSTETRICS & GYNECOLOGY

## 2024-06-25 PROCEDURE — 3288F FALL RISK ASSESSMENT DOCD: CPT | Mod: CPTII,,, | Performed by: OBSTETRICS & GYNECOLOGY

## 2024-06-25 PROCEDURE — 99999 PR PBB SHADOW E&M-EST. PATIENT-LVL III: CPT | Mod: PBBFAC,,, | Performed by: OBSTETRICS & GYNECOLOGY

## 2024-06-25 PROCEDURE — 1101F PT FALLS ASSESS-DOCD LE1/YR: CPT | Mod: CPTII,,, | Performed by: OBSTETRICS & GYNECOLOGY

## 2024-06-25 PROCEDURE — 3077F SYST BP >= 140 MM HG: CPT | Mod: CPTII,,, | Performed by: OBSTETRICS & GYNECOLOGY

## 2024-06-25 PROCEDURE — 99024 POSTOP FOLLOW-UP VISIT: CPT | Mod: ,,, | Performed by: OBSTETRICS & GYNECOLOGY

## 2024-06-25 PROCEDURE — 1159F MED LIST DOCD IN RCRD: CPT | Mod: CPTII,,, | Performed by: OBSTETRICS & GYNECOLOGY

## 2024-06-25 RX ORDER — ESTRADIOL 0.1 MG/G
CREAM VAGINAL
Qty: 42.5 G | Refills: 1 | Status: SHIPPED | OUTPATIENT
Start: 2024-06-25

## 2024-06-25 NOTE — PATIENT INSTRUCTIONS
Discussed that I am very pleased with outcome.    Discussed beginning Estrace vaginal cream 1/2 applicator full at bedtime every other week.  Follow-up appointment with me in 2 months.    Because of extensive anterior and posterior vaginal colporrhaphy with vaginal hysterectomy I have recommended off work until August 5th.

## 2024-06-25 NOTE — PROGRESS NOTES
Subjective:       Patient ID: Chaya Walls is a 68 y.o. female.    Chief Complaint: Post-op Evaluation (Pt c/o light brown discharge with a little smell. Also stated that she was still a little sore. )    Presents for follow-up.  She is now 5 weeks post LAVH BSO with extensive anterior and posterior vaginal colporrhaphy.      She is very pleased with surgery.  No further incontinence since surgery.  However we were unable to perform mid urethral sling.  Nevertheless with extensive anterior and posterior vaginal colporrhaphy she has not had any further incontinence.    Review of Systems      Objective:      Physical Exam  Genitourinary:     Comments: External normal.  On speculum examination vaginal mucosal incisions healing well.  On digital examination no further adhesions were noted.  Good depth of the vaginal vault.  Vaginal width snugly admits 2 fingerbreadths in the outer 2/3 of the vaginal vault.            Assessment:       1. Postop check    2. Atrophic vaginitis        Plan:       Patient Instructions   Discussed that I am very pleased with outcome.    Discussed beginning Estrace vaginal cream 1/2 applicator full at bedtime every other week.  Follow-up appointment with me in 2 months.    Because of extensive anterior and posterior vaginal colporrhaphy with vaginal hysterectomy I have recommended off work until August 5th.

## 2024-06-25 NOTE — LETTER
June 25, 2024      [facility]  [address] [place] [id number]  Phone: [phone]  Fax: [phone]       Patient: Chaya Walls   YOB: 1955  Date of Visit: 06/25/2024    To Whom It May Concern:    Jake Walls  was at [facility] on 06/25/2024. The patient may return to work/school on August 5, 2024 with no restrictions. If you have any questions or concerns, or if I can be of further assistance, please do not hesitate to contact me.    Sincerely,    Rahel Solares LPN

## 2024-07-09 ENCOUNTER — TELEPHONE (OUTPATIENT)
Dept: OBSTETRICS AND GYNECOLOGY | Facility: CLINIC | Age: 69
End: 2024-07-09
Payer: MEDICARE

## 2024-07-09 NOTE — TELEPHONE ENCOUNTER
----- Message from Breanne Salamanca sent at 7/9/2024  9:08 AM CDT -----  Regarding: PAPERWORK TO [facility]  Who Called: Chaya Walls        Who Left Message for Patient:  Does the patient know what this is regarding?:PAPERWORK      Preferred Method of Contact: Phone Call  Patient's Preferred Phone Number on File: [phone]   Best Call Back Number, if different:  Additional Information: MAKING SURE HER PAPERWORK IS SENT TO [facility]. SHE IS ASKING IF SOMEONE WILL CALL HER WHEN IT IS SENT.

## 2024-08-09 ENCOUNTER — OFFICE VISIT (OUTPATIENT)
Dept: FAMILY MEDICINE | Facility: CLINIC | Age: 69
End: 2024-08-09
Payer: MEDICARE

## 2024-08-09 VITALS
SYSTOLIC BLOOD PRESSURE: 136 MMHG | OXYGEN SATURATION: 97 % | DIASTOLIC BLOOD PRESSURE: 77 MMHG | TEMPERATURE: 98 F | WEIGHT: 154.38 LBS | BODY MASS INDEX: 25.72 KG/M2 | HEART RATE: 76 BPM | RESPIRATION RATE: 18 BRPM | HEIGHT: 65 IN

## 2024-08-09 DIAGNOSIS — R05.9 COUGH, UNSPECIFIED TYPE: ICD-10-CM

## 2024-08-09 DIAGNOSIS — J04.0 ACUTE LARYNGITIS: Primary | ICD-10-CM

## 2024-08-09 LAB
CTP QC/QA: YES
SARS-COV-2 RDRP RESP QL NAA+PROBE: NEGATIVE

## 2024-08-09 PROCEDURE — 1159F MED LIST DOCD IN RCRD: CPT | Mod: ,,, | Performed by: NURSE PRACTITIONER

## 2024-08-09 PROCEDURE — 99213 OFFICE O/P EST LOW 20 MIN: CPT | Mod: 25,,, | Performed by: NURSE PRACTITIONER

## 2024-08-09 PROCEDURE — 3078F DIAST BP <80 MM HG: CPT | Mod: ,,, | Performed by: NURSE PRACTITIONER

## 2024-08-09 PROCEDURE — 3008F BODY MASS INDEX DOCD: CPT | Mod: ,,, | Performed by: NURSE PRACTITIONER

## 2024-08-09 PROCEDURE — 1125F AMNT PAIN NOTED PAIN PRSNT: CPT | Mod: ,,, | Performed by: NURSE PRACTITIONER

## 2024-08-09 PROCEDURE — 87635 SARS-COV-2 COVID-19 AMP PRB: CPT | Mod: QW,,, | Performed by: NURSE PRACTITIONER

## 2024-08-09 PROCEDURE — 96372 THER/PROPH/DIAG INJ SC/IM: CPT | Mod: ,,, | Performed by: NURSE PRACTITIONER

## 2024-08-09 PROCEDURE — 1101F PT FALLS ASSESS-DOCD LE1/YR: CPT | Mod: ,,, | Performed by: NURSE PRACTITIONER

## 2024-08-09 PROCEDURE — 3288F FALL RISK ASSESSMENT DOCD: CPT | Mod: ,,, | Performed by: NURSE PRACTITIONER

## 2024-08-09 PROCEDURE — 1160F RVW MEDS BY RX/DR IN RCRD: CPT | Mod: ,,, | Performed by: NURSE PRACTITIONER

## 2024-08-09 PROCEDURE — 3075F SYST BP GE 130 - 139MM HG: CPT | Mod: ,,, | Performed by: NURSE PRACTITIONER

## 2024-08-09 RX ORDER — DEXAMETHASONE SODIUM PHOSPHATE 4 MG/ML
4 INJECTION, SOLUTION INTRA-ARTICULAR; INTRALESIONAL; INTRAMUSCULAR; INTRAVENOUS; SOFT TISSUE
Status: COMPLETED | OUTPATIENT
Start: 2024-08-09 | End: 2024-08-09

## 2024-08-09 RX ORDER — CEFTRIAXONE 1 G/1
1 INJECTION, POWDER, FOR SOLUTION INTRAMUSCULAR; INTRAVENOUS
Status: COMPLETED | OUTPATIENT
Start: 2024-08-09 | End: 2024-08-09

## 2024-08-09 RX ORDER — AMOXICILLIN AND CLAVULANATE POTASSIUM 875; 125 MG/1; MG/1
1 TABLET, FILM COATED ORAL 2 TIMES DAILY
Qty: 20 TABLET | Refills: 0 | Status: SHIPPED | OUTPATIENT
Start: 2024-08-09 | End: 2024-08-19

## 2024-08-09 RX ADMIN — CEFTRIAXONE 1 G: 1 INJECTION, POWDER, FOR SOLUTION INTRAMUSCULAR; INTRAVENOUS at 09:08

## 2024-08-09 RX ADMIN — DEXAMETHASONE SODIUM PHOSPHATE 4 MG: 4 INJECTION, SOLUTION INTRA-ARTICULAR; INTRALESIONAL; INTRAMUSCULAR; INTRAVENOUS; SOFT TISSUE at 09:08

## 2024-08-09 NOTE — LETTER
August 9, 2024    Chaya Walls  [address] [place] [address] [place] [address]             [facility]  Family Medicine  [address] [place] [address] [place] [address]  Phone: [phone]  Fax: [phone]   August 9, 2024     Patient: Chaya Walls   YOB: 1955   Date of Visit: 8/9/2024       To Whom it May Concern:    Chaya Walls was seen in my clinic on 8/9/2024. She may return to work on 8/14/2024 .    Please excuse her from any classes or work missed.    If you have any questions or concerns, please don't hesitate to call.    Sincerely,         Melissa Telles FNP

## 2024-08-09 NOTE — PROGRESS NOTES
KRISTOFER Valentin   [facility]  [address]  [place] [address]     PATIENT NAME: Chaya Walls  : 1955  DATE: 24  MRN: [id number]      Billing Provider: KRISTOFER Valentin  Level of Service:   Patient PCP Information       Provider PCP Type    Misa Reyes MD General            Reason for Visit / Chief Complaint: Nasal Congestion (Pt states she has been having body aches, chills, stopped up nose. Started around 10:30pm, last night. )   Health Maintenance Due   Topic Date Due    Hepatitis C Screening  Never done    TETANUS VACCINE  Never done    Shingles Vaccine (1 of 2) Never done    RSV Vaccine (Age 60+ and Pregnant patients) (1 - 1-dose 60+ series) Never done    Pneumococcal Vaccines (Age 65+) (1 of 1 - PCV) Never done    COVID-19 Vaccine ( season) 2023          Update PCP  Update Chief Complaint         History of Present Illness / Problem Focused Workflow     Chaya Walls presents to the clinic for sick visit. Pt states she has been having body aches, chills, nasal congestion, cough headaches. Started around 10:30pm, last night. She works at [employer] so it is possible she has been exposed to covid. She does not have a significant PMH of lung disorders and is not on any respiratory medications. She denies any other needs at this time. NAD noted.     Hemoglobin A1C   Date Value Ref Range Status   2023 5.8 4.5 - 6.6 % Final     Comment:       Normal:               <5.7%  Pre-Diabetic:       5.7% to 6.4%  Diabetic:             >6.4%  Diabetic Goal:     <7%        CMP  Sodium   Date Value Ref Range Status   2024 143 136 - 145 mmol/L Final     Potassium   Date Value Ref Range Status   2024 4.5 3.5 - 5.1 mmol/L Final     Chloride   Date Value Ref Range Status   2024 112 (H) 98 - 107 mmol/L Final     CO2   Date Value Ref Range Status   2024 26 21 - 32 mmol/L Final     Glucose   Date Value Ref Range Status   2024 105 74 - 106 mg/dL  Final     BUN   Date Value Ref Range Status   05/14/2024 18 7 - 18 mg/dL Final     Creatinine   Date Value Ref Range Status   05/23/2024 1.14 (H) 0.55 - 1.02 mg/dL Final     Calcium   Date Value Ref Range Status   05/14/2024 8.8 8.5 - 10.1 mg/dL Final     Total Protein   Date Value Ref Range Status   05/14/2024 7.6 6.4 - 8.2 g/dL Final     Albumin   Date Value Ref Range Status   05/14/2024 4.0 3.5 - 5.0 g/dL Final     Bilirubin, Total   Date Value Ref Range Status   05/14/2024 0.3 >0.0 - 1.2 mg/dL Final     Alk Phos   Date Value Ref Range Status   05/14/2024 69 55 - 142 U/L Final     AST   Date Value Ref Range Status   05/14/2024 24 15 - 37 U/L Final     ALT   Date Value Ref Range Status   05/14/2024 20 13 - 56 U/L Final     Anion Gap   Date Value Ref Range Status   05/14/2024 10 7 - 16 mmol/L Final     eGFR   Date Value Ref Range Status   05/23/2024 53 (L) >=60 mL/min/1.73m2 Final        Lab Results   Component Value Date    WBC 12.74 (H) 05/21/2024    RBC 4.19 (L) 05/21/2024    HGB 11.3 (L) 05/23/2024    HCT 35.7 (L) 05/23/2024    MCV 88.8 05/21/2024    MCH 28.9 05/21/2024    MCHC 32.5 05/21/2024    RDW 12.7 05/21/2024     05/21/2024    MPV 10.8 05/21/2024    LYMPH 11.6 (L) 05/21/2024    LYMPH 1.48 05/21/2024    MONO 8.3 (H) 05/21/2024    EOS 0.00 05/21/2024    BASO 0.02 05/21/2024    EOSINOPHIL 0.0 (L) 05/21/2024    BASOPHIL 0.2 05/21/2024        Lab Results   Component Value Date    CHOL 229 (H) 10/09/2023    CHOL 225 (H) 06/15/2022    CHOL 225 (H) 07/19/2021     Lab Results   Component Value Date    HDL 65 (H) 10/09/2023    HDL 78 (H) 06/15/2022    HDL 67 (H) 07/19/2021     Lab Results   Component Value Date    LDLCALC 136 10/09/2023    LDLCALC 129 06/15/2022    LDLCALC 129 07/19/2021     Lab Results   Component Value Date    TRIG 142 10/09/2023    TRIG 91 06/15/2022    TRIG 146 07/19/2021     Lab Results   Component Value Date    CHOLHDL 3.5 10/09/2023    CHOLHDL 2.9 06/15/2022    CHOLHDL 3.4 07/19/2021         Wt Readings from Last 3 Encounters:   08/09/24 0825 70 kg (154 lb 6.4 oz)   06/25/24 1402 68 kg (150 lb)   05/20/24 1545 66.2 kg (146 lb)   05/20/24 0621 66.2 kg (146 lb)        BP Readings from Last 3 Encounters:   08/27/24 122/70   08/09/24 136/77   06/25/24 (!) 142/82        Review of Systems     Review of Systems   Constitutional:  Positive for chills and fatigue.   HENT:  Positive for nasal congestion and voice change. Negative for dental problem, drooling, ear discharge, ear pain, facial swelling, hearing loss, mouth dryness, mouth sores, nosebleeds, postnasal drip, rhinorrhea, sinus pressure/congestion, sneezing, sore throat, tinnitus, trouble swallowing and goiter.    Eyes: Negative.    Respiratory:  Positive for cough. Negative for apnea, shortness of breath and wheezing.    Cardiovascular: Negative.    Gastrointestinal: Negative.    Endocrine: Negative.    Genitourinary: Negative.    Musculoskeletal:  Positive for myalgias.   Integumentary:  Negative.   Allergic/Immunologic: Negative.    Neurological:  Positive for headaches.   Hematological: Negative.    Psychiatric/Behavioral: Negative.          Medical / Social / Family History     Past Medical History:   Diagnosis Date    Breast cancer     left    Cystocele with third degree uterine prolapse     Hypertension     Known health problems: none     Pneumonia of left upper lobe due to infectious organism 02/13/2023    PONV (postoperative nausea and vomiting)        Past Surgical History:   Procedure Laterality Date    ARTHROSCOPY OF KNEE Left 7/8/2021    Procedure: ARTHROSCOPY, KNEE;  Surgeon: Pino Varma MD;  Location: [facility] OR;  Service: Orthopedics;  Laterality: Left;    BREAST BIOPSY      BREAST LUMPECTOMY      CARDIAC CATHETERIZATION      CHOLECYSTECTOMY      COLPORRHAPHY, COMBINED ANTEROPOSTERIOR N/A 5/20/2024    Procedure: COLPORRHAPHY, COMBINED ANTEROPOSTERIOR;  Surgeon: Wilner Palomino MD;  Location: [facility] OR;   Service: OB/GYN;  Laterality: N/A;    CYSTOSCOPY N/A 5/20/2024    Procedure: CYSTOSCOPY;  Surgeon: Wilner Palomino MD;  Location: [facility] OR;  Service: OB/GYN;  Laterality: N/A;    DILATION AND CURETTAGE OF UTERUS      with hysteroscopy    HYSTERECTOMY, VAGINAL, LAPAROSCOPY-ASSISTED, WITH SALPINGO-OOPHORECTOY Bilateral 5/20/2024    Procedure: HYSTERECTOMY,VAGINAL,LAPAROSCOPY-ASSISTED,WITH SALPINGO-OOPHORECTOMY;  Surgeon: Wilner Palomino MD;  Location: [facility] OR;  Service: OB/GYN;  Laterality: Bilateral;    LUMPECTOMY,BREAST, WITH RADIOACTIVE SEED LOCALIZATION AND SENTINEL LYMPH NODE BIOPSY Left     LYSIS OF ADHESIONS N/A 5/20/2024    Procedure: LYSIS, ADHESIONS;  Surgeon: Wilner Palomino MD;  Location: [facility] OR;  Service: OB/GYN;  Laterality: N/A;    PLACEMENT OF TRANSOBTURATOR TAPE N/A 5/20/2024    Procedure: PLACEMENT, TRANSOBTURATOR TAPE;  Surgeon: Wilner Palomino MD;  Location: [facility];  Service: OB/GYN;  Laterality: N/A;    SHOULDER ARTHROSCOPY Bilateral     TUBAL LIGATION         Social History  Ms.  reports that she has quit smoking. She has never used smokeless tobacco. She reports that she does not drink alcohol and does not use drugs.    Family History  Ms.'s family history includes Breast cancer in her daughter; Heart disease in her father; Hypertension in her mother; No Known Problems in her brother and sister.    Medications and Allergies     Medications  Outpatient Medications Marked as Taking for the 8/9/24 encounter (Office Visit) with Melissa Telles FNP   Medication Sig Dispense Refill    alendronate (FOSAMAX) 70 MG tablet Take 1 tablet (70 mg total) by mouth every 7 days. 12 tablet 3    multivitamin (THERAGRAN) per tablet Take 1 tablet by mouth once daily.      propranoloL (INDERAL LA) 80 MG 24 hr capsule Take 80 mg by mouth once daily.      [DISCONTINUED] traMADoL (ULTRAM) 50 mg tablet Take 1 tablet (50 mg total) by mouth every 6 (six) hours as needed for Pain.  "(Patient not taking: Reported on 8/27/2024) 10 tablet 0       Allergies  Review of patient's allergies indicates:   Allergen Reactions    Sulfa (sulfonamide antibiotics)        Physical Examination     Vitals:    08/09/24 0825   BP: 136/77   BP Location: Left arm   Patient Position: Sitting   BP Method: Medium (Automatic)   Pulse: 76   Resp: 18   Temp: 98.4 °F (36.9 °C)   TempSrc: Oral   SpO2: 97%   Weight: 70 kg (154 lb 6.4 oz)   Height: 5' 5" (1.651 m)      Physical Exam  Constitutional:       Appearance: Normal appearance.   HENT:      Head: Normocephalic.      Right Ear: Hearing, tympanic membrane, ear canal and external ear normal.      Left Ear: Hearing, tympanic membrane, ear canal and external ear normal.      Nose: Congestion present.      Right Turbinates: Swollen.      Left Turbinates: Swollen.      Mouth/Throat:      Mouth: Mucous membranes are moist.      Pharynx: Posterior oropharyngeal erythema present.   Eyes:      Extraocular Movements: Extraocular movements intact.   Cardiovascular:      Rate and Rhythm: Normal rate and regular rhythm.      Pulses: Normal pulses.      Heart sounds: Normal heart sounds.   Pulmonary:      Effort: Pulmonary effort is normal.      Breath sounds: Normal breath sounds.   Skin:     General: Skin is warm and dry.      Capillary Refill: Capillary refill takes less than 2 seconds.   Neurological:      General: No focal deficit present.      Mental Status: She is alert and oriented to person, place, and time.   Psychiatric:         Mood and Affect: Mood normal.         Behavior: Behavior normal.          Assessment and Plan (including Health Maintenance)      Problem List  Smart Sets  Document Outside HM   :    Plan:     There are no Patient Instructions on file for this visit.       Health Maintenance Due   Topic Date Due    Hepatitis C Screening  Never done    TETANUS VACCINE  Never done    Shingles Vaccine (1 of 2) Never done    RSV Vaccine (Age 60+ and Pregnant patients) " (1 - 1-dose 60+ series) Never done    Pneumococcal Vaccines (Age 65+) (1 of 1 - PCV) Never done    COVID-19 Vaccine (4 - 2023-24 season) 09/01/2023       Problem List Items Addressed This Visit       Acute laryngitis - Primary    Current Assessment & Plan     COVID negative.   Rocephin 1g/Decadron 4mg injections given. No adverse reaction noted.   Augmentin prescribed to take as directed. Instructed to take all abx until gone even if start to feel better.    Instructed may alternate Tylenol/Motrin for pain/fever if not contraindicated.    May take otc flonase nasal spray for congestion.   Increase water intake.   Instructed to RTC for any new/worsening/persisting ssx.           Cough    Current Assessment & Plan     See above plan.          Relevant Orders    POCT COVID-19 Rapid Screening (Completed)     Acute laryngitis  -     cefTRIAXone injection 1 g  -     dexAMETHasone injection 4 mg  -     amoxicillin-clavulanate 875-125mg (AUGMENTIN) 875-125 mg per tablet; Take 1 tablet by mouth 2 (two) times daily. for 10 days  Dispense: 20 tablet; Refill: 0    Cough, unspecified type  -     POCT COVID-19 Rapid Screening       Health Maintenance Topics with due status: Not Due       Topic Last Completion Date    Colorectal Cancer Screening 01/06/2020    Pap Smear 12/08/2021    DEXA Scan 02/14/2023    Hemoglobin A1c (Diabetic Prevention Screening) 03/27/2023    Lipid Panel 10/09/2023    Mammogram 01/24/2024    Influenza Vaccine Not Due         Future Appointments   Date Time Provider Department Center   10/4/2024  9:20 AM Melissa Telles FNP [facility]   1/29/2025  9:20 AM [facility] [place]   3/13/2025  2:00 PM Adonis Solares MD OB O[facility]        Follow up if symptoms worsen or fail to improve.    Health Maintenance Due   Topic Date Due    Hepatitis C Screening  Never done    TETANUS VACCINE  Never done    Shingles Vaccine (1 of 2) Never done    RSV Vaccine (Age 60+ and Pregnant  patients) (1 - 1-dose 60+ series) Never done    Pneumococcal Vaccines (Age 65+) (1 of 1 - PCV) Never done    COVID-19 Vaccine (4 - 2023-24 season) 09/01/2023        Signature:  KRISTOFER Valentin    Date of encounter: 8/9/24

## 2024-08-27 ENCOUNTER — OFFICE VISIT (OUTPATIENT)
Dept: OBSTETRICS AND GYNECOLOGY | Facility: CLINIC | Age: 69
End: 2024-08-27
Payer: MEDICARE

## 2024-08-27 VITALS — DIASTOLIC BLOOD PRESSURE: 70 MMHG | SYSTOLIC BLOOD PRESSURE: 122 MMHG

## 2024-08-27 DIAGNOSIS — N95.2 ATROPHIC VAGINITIS: ICD-10-CM

## 2024-08-27 DIAGNOSIS — Z09 POSTOP CHECK: Primary | ICD-10-CM

## 2024-08-27 PROCEDURE — 3074F SYST BP LT 130 MM HG: CPT | Mod: CPTII,,, | Performed by: OBSTETRICS & GYNECOLOGY

## 2024-08-27 PROCEDURE — 99213 OFFICE O/P EST LOW 20 MIN: CPT | Mod: PBBFAC | Performed by: OBSTETRICS & GYNECOLOGY

## 2024-08-27 PROCEDURE — 1159F MED LIST DOCD IN RCRD: CPT | Mod: CPTII,,, | Performed by: OBSTETRICS & GYNECOLOGY

## 2024-08-27 PROCEDURE — 99999 PR PBB SHADOW E&M-EST. PATIENT-LVL III: CPT | Mod: PBBFAC,,, | Performed by: OBSTETRICS & GYNECOLOGY

## 2024-08-27 PROCEDURE — 1101F PT FALLS ASSESS-DOCD LE1/YR: CPT | Mod: CPTII,,, | Performed by: OBSTETRICS & GYNECOLOGY

## 2024-08-27 PROCEDURE — 3288F FALL RISK ASSESSMENT DOCD: CPT | Mod: CPTII,,, | Performed by: OBSTETRICS & GYNECOLOGY

## 2024-08-27 PROCEDURE — 99024 POSTOP FOLLOW-UP VISIT: CPT | Mod: ,,, | Performed by: OBSTETRICS & GYNECOLOGY

## 2024-08-27 PROCEDURE — 3078F DIAST BP <80 MM HG: CPT | Mod: CPTII,,, | Performed by: OBSTETRICS & GYNECOLOGY

## 2024-08-27 RX ORDER — ESTRADIOL 0.1 MG/G
CREAM VAGINAL
Qty: 42.5 G | Refills: 3 | Status: SHIPPED | OUTPATIENT
Start: 2024-08-27

## 2024-08-27 NOTE — PATIENT INSTRUCTIONS
We have discussed continuing using Estrace vaginal cream 1/2 applicator full once or twice monthly.    Discussed possibility of sexual relations in the future.    Stretching technique of vaginal vault discussed.    She will continue yearly mammography screening.      Continue colonoscopy screening as directed    Glucose cholesterol testing by primary care provider.

## 2024-08-27 NOTE — PROGRESS NOTES
Subjective:       Patient ID: Chaya Walls is a 68 y.o. female.    Chief Complaint: Post-op Evaluation (Here for 3 month post op check after LAVH/BSO A&P REPAIR-)    Presents for three-month follow-up after laparoscopically assisted vaginal hysterectomy bilateral salpingo-oophorectomy anterior and posterior vaginal colporrhaphy.    Review of Systems      Objective:      Physical Exam  Genitourinary:     Comments: External genitalia normal to appearance.    Small speculum was used.  Vaginal vault and cuff well-healed.  No granulation tissue noted.  No bleeding during examination.  Digital examination reveals vaginal vault snugly admits 2 fingerbreadths.  Slight narrowing of the vaginal vault in the midportion of the vaginal vault.    Bimanual exam otherwise unremarkable.              Assessment:       1. Postop check    2. Atrophic vaginitis        Plan:       Patient Instructions   We have discussed continuing using Estrace vaginal cream 1/2 applicator full once or twice monthly.    Discussed possibility of sexual relations in the future.    Stretching technique of vaginal vault discussed.    She will continue yearly mammography screening.      Continue colonoscopy screening as directed    Glucose cholesterol testing by primary care provider.

## 2024-08-28 PROBLEM — R05.9 COUGH: Status: ACTIVE | Noted: 2024-08-28

## 2024-08-28 PROBLEM — J04.0 ACUTE LARYNGITIS: Status: ACTIVE | Noted: 2024-08-28

## 2024-08-28 NOTE — ASSESSMENT & PLAN NOTE
COVID negative.   Rocephin 1g/Decadron 4mg injections given. No adverse reaction noted.   Augmentin prescribed to take as directed. Instructed to take all abx until gone even if start to feel better.    Instructed may alternate Tylenol/Motrin for pain/fever if not contraindicated.    May take otc flonase nasal spray for congestion.   Increase water intake.   Instructed to RTC for any new/worsening/persisting ssx.

## 2025-03-03 ENCOUNTER — PATIENT MESSAGE (OUTPATIENT)
Dept: OBSTETRICS AND GYNECOLOGY | Facility: CLINIC | Age: 70
End: 2025-03-03
Payer: MEDICARE

## 2025-03-03 ENCOUNTER — TELEPHONE (OUTPATIENT)
Dept: OBSTETRICS AND GYNECOLOGY | Facility: CLINIC | Age: 70
End: 2025-03-03
Payer: MEDICARE

## 2025-03-03 NOTE — TELEPHONE ENCOUNTER
Left message for pt to return call to clinic.       ----- Message from Estefany sent at 3/3/2025  8:34 AM CST -----  Regarding: talk to a nurse  Who Called: Chaya Villafuerte is requesting assistance/information from provider's office.Symptoms (please be specific): talk to a nurse about her appointment  Preferred Method of Contact: Phone CallPatient's Preferred Phone Number on File: [phone] Best Call Back Number, if different:Additional Information:

## 2025-03-03 NOTE — TELEPHONE ENCOUNTER
Spoke with pt about appt time; explained I would update the note and shouldn't be an issue with her being a little late. Pt voiced understanding.       ----- Message from Med Assistant Cassidy sent at 3/3/2025  4:06 PM CST -----  Who Called: Chaya Sommers is returning phone callWho Left Message for Patient:AmandaDoes the patient know what this is regarding?:phone encounterPreferred Method of Contact: Phone CallPatient's Preferred Phone Number on File: [phone] Best Call Back Number, if different:Additional Information: return call

## 2025-03-12 NOTE — PROGRESS NOTES
Annual Exam    Assessment/Plan:  69 y.o.  presenting for her annual exam:    Problem List Items Addressed This Visit    None  Visit Diagnoses         Encounter for screening colonoscopy    -  Primary      Osteoporosis, unspecified osteoporosis type, unspecified pathological fracture presence        Relevant Medications    alendronate (FOSAMAX) 70 MG tablet    Other Relevant Orders    DXA Bone Density Axial Skeleton 1 or more sites      Atrophic vaginitis        Relevant Medications    estradioL (ESTRACE) 0.01 % (0.1 mg/gram) vaginal cream      Urinary, incontinence, stress female          Encounter for gynecological examination (general) (routine) with abnormal findings              Annual exam performed.   F/u for annual mammograms as scheduled.  F/u for colon cancer screens as scheduled.  F/u DEXA scans as scheduled.   F/u with primary care as scheduled.     RTC in 1 year for annual exam and/or prn.     Discussed MO[facility]. Recommended pelvic floor PT. She is going to think about it and call back if she desires a referral.  Estrace cream for atrophic vaginitis.  Fosamax refilled.    CC:   Chief Complaint   Patient presents with    Annual Exam     Pt state she is having some leakage when she sneezes or coughs        HPI:  69 y.o.  presents for her gynecologic annual exam.    Patient seen and examined.  Patient states she has some urinary stress incontinence. Pelvic floor therapy discussed; patient will contact if she desires. Needs refills on Fosamax.    Health Maintenance:    Birth control: Hysterectomy  Pregnancy plans: none   Safe relationship: Yes  Healthy diet: ?   Exercise: ?  PCP: Misa Reyes MD     Screening:  Last pap smear: 2021- negative  History of abnormal pap smears: Denies  STI screening: Declines  Mammogram: 2024- negative; scheduled 2025  Colonoscopy or colon cancer screening: Ordered    Review of Systems: The following ROS was otherwise negative, except as noted in the  HPI:  constitutional, HEENT, respiratory, cardiovascular, gastrointestinal, genitourinary, skin, musculoskeletal, neurological, psych    Primary Care Physician: Misa Reyes MD    Obstetric History  OB History    Para Term  AB Living   1 1 1   1   SAB IAB Ectopic Multiple Live Births       1      # Outcome Date GA Lbr Naun/2nd Weight Sex Type Anes PTL Lv   1 Term    3.515 kg (7 lb 12 oz) F Vag-Spont   TAMIKO       Gynecologic History  Menstrual History:   LMP: Unknown    Age of Menarche: 15   Age of Menopause: 54      Sexual History:    Contraception: see above   Currently is sexually active   Denies history of STIs   Denies sexual problems    Pap History:   History of abnormal pap smears: see above   Last pap: see above    Medical History:  Past Medical History:   Diagnosis Date    Breast cancer     left    Cystocele with third degree uterine prolapse     Hypertension     Known health problems: none     Pneumonia of left upper lobe due to infectious organism 2023    PONV (postoperative nausea and vomiting)        Medications:  Medication List with Changes/Refills   New Medications    ESTRADIOL (ESTRACE) 0.01 % (0.1 MG/GRAM) VAGINAL CREAM    Place 1 g vaginally twice a week.   Current Medications    AMLODIPINE (NORVASC) 5 MG TABLET    Take 5 mg by mouth.    MULTIVITAMIN (THERAGRAN) PER TABLET    Take 1 tablet by mouth once daily.    PROPRANOLOL (INDERAL LA) 80 MG 24 HR CAPSULE    Take 80 mg by mouth once daily.   Changed and/or Refilled Medications    Modified Medication Previous Medication    ALENDRONATE (FOSAMAX) 70 MG TABLET alendronate (FOSAMAX) 70 MG tablet       Take 1 tablet (70 mg total) by mouth every 7 days.    Take 1 tablet (70 mg total) by mouth every 7 days.   Discontinued Medications    ESTRADIOL (ESTRACE) 0.01 % (0.1 MG/GRAM) VAGINAL CREAM    1/2 applicatorful vaginally once or twice monthly as needed for vaginal dryness    ESTRADIOL (ESTRACE) 0.01 % (0.1 MG/GRAM) VAGINAL CREAM     1/2 applicator full (2 g) per vagina twice monthly         Surgical History:  Past Surgical History:   Procedure Laterality Date    ARTHROSCOPY OF KNEE Left 7/8/2021    Procedure: ARTHROSCOPY, KNEE;  Surgeon: Pino Varma MD;  Location: [facility];  Service: Orthopedics;  Laterality: Left;    BREAST BIOPSY      BREAST LUMPECTOMY      CARDIAC CATHETERIZATION      CHOLECYSTECTOMY      COLPORRHAPHY, COMBINED ANTEROPOSTERIOR N/A 5/20/2024    Procedure: COLPORRHAPHY, COMBINED ANTEROPOSTERIOR;  Surgeon: Wilner Palomino MD;  Location: [facility];  Service: OB/GYN;  Laterality: N/A;    CYSTOSCOPY N/A 5/20/2024    Procedure: CYSTOSCOPY;  Surgeon: Wilner Palomino MD;  Location: [facility];  Service: OB/GYN;  Laterality: N/A;    DILATION AND CURETTAGE OF UTERUS      with hysteroscopy    HYSTERECTOMY, VAGINAL, LAPAROSCOPY-ASSISTED, WITH SALPINGO-OOPHORECTOY Bilateral 5/20/2024    Procedure: HYSTERECTOMY,VAGINAL,LAPAROSCOPY-ASSISTED,WITH SALPINGO-OOPHORECTOMY;  Surgeon: Wilner Palomino MD;  Location: [facility];  Service: OB/GYN;  Laterality: Bilateral;    LUMPECTOMY,BREAST, WITH RADIOACTIVE SEED LOCALIZATION AND SENTINEL LYMPH NODE BIOPSY Left     LYSIS OF ADHESIONS N/A 5/20/2024    Procedure: LYSIS, ADHESIONS;  Surgeon: Wilner Palomino MD;  Location: [facility];  Service: OB/GYN;  Laterality: N/A;    PLACEMENT OF TRANSOBTURATOR TAPE N/A 5/20/2024    Procedure: PLACEMENT, TRANSOBTURATOR TAPE;  Surgeon: Wilner Palomino MD;  Location: [facility];  Service: OB/GYN;  Laterality: N/A;    SHOULDER ARTHROSCOPY Bilateral     TUBAL LIGATION         Allergies:  Review of patient's allergies indicates:   Allergen Reactions    Sulfa (sulfonamide antibiotics)        Family History:  Family History   Problem Relation Name Age of Onset    Hypertension Mother      Heart disease Father      No Known Problems Sister      No Known Problems Brother      Breast cancer Daughter         Social  "History:  Social History     Socioeconomic History    Marital status:    Tobacco Use    Smoking status: Former    Smokeless tobacco: Never   Substance and Sexual Activity    Alcohol use: Never    Drug use: Never    Sexual activity: Yes     Partners: Male     Birth control/protection: None       Objective:  Body mass index is 24.23 kg/m².    /73 (BP Location: Left arm, Patient Position: Sitting)   Pulse 69   Ht 5' 5" (1.651 m)   Wt 66 kg (145 lb 9.6 oz)   BMI 24.23 kg/m²     General: Alert, well appearing, no acute distress  Head: Normocephalic, atraumatic  Breasts: Symmetric, non-tender to palpation, no skin changes, palpable axillary lymph nodes or masses noted  Lungs: Unlabored respirations. Clear to auscultation bilaterally.  Cardiovascular: Regular rate and rhythm.   Abdomen: Soft, nontender, nondistended   Pelvic: Exam chaperoned by female assistant.   External: normal female genitalia, no masses or lesions   Vagina: pale, pink mucosa with decreased rugae, minimal clear discharge. Vaginal cuff intact without lesions.   Cervix: surgically absent.  Uterus: surgically absent.  Adnexa: no masses or fullness, nontender  Rectovaginal: deferred  Extremities: No redness or tenderness  Skin: Well perfused, normal coloration and turgor, no lesions or rashes visualized  Neuro: Alert, oriented, normal speech, no focal deficits, moves extremities appropriately  Psych: Normal mood and behavior.     Adonis Solares MD     "

## 2025-03-13 ENCOUNTER — OFFICE VISIT (OUTPATIENT)
Dept: OBSTETRICS AND GYNECOLOGY | Facility: CLINIC | Age: 70
End: 2025-03-13
Payer: MEDICARE

## 2025-03-13 VITALS
WEIGHT: 145.63 LBS | HEIGHT: 65 IN | HEART RATE: 69 BPM | DIASTOLIC BLOOD PRESSURE: 73 MMHG | BODY MASS INDEX: 24.26 KG/M2 | SYSTOLIC BLOOD PRESSURE: 122 MMHG

## 2025-03-13 DIAGNOSIS — Z12.11 ENCOUNTER FOR SCREENING COLONOSCOPY: Primary | ICD-10-CM

## 2025-03-13 DIAGNOSIS — N39.3 URINARY, INCONTINENCE, STRESS FEMALE: ICD-10-CM

## 2025-03-13 DIAGNOSIS — Z01.411 ENCOUNTER FOR GYNECOLOGICAL EXAMINATION (GENERAL) (ROUTINE) WITH ABNORMAL FINDINGS: ICD-10-CM

## 2025-03-13 DIAGNOSIS — M81.0 OSTEOPOROSIS, UNSPECIFIED OSTEOPOROSIS TYPE, UNSPECIFIED PATHOLOGICAL FRACTURE PRESENCE: ICD-10-CM

## 2025-03-13 DIAGNOSIS — N95.2 ATROPHIC VAGINITIS: ICD-10-CM

## 2025-03-13 PROCEDURE — 1159F MED LIST DOCD IN RCRD: CPT | Mod: CPTII,,, | Performed by: OBSTETRICS & GYNECOLOGY

## 2025-03-13 PROCEDURE — 3288F FALL RISK ASSESSMENT DOCD: CPT | Mod: CPTII,,, | Performed by: OBSTETRICS & GYNECOLOGY

## 2025-03-13 PROCEDURE — 99213 OFFICE O/P EST LOW 20 MIN: CPT | Mod: PBBFAC | Performed by: OBSTETRICS & GYNECOLOGY

## 2025-03-13 PROCEDURE — G0101 CA SCREEN;PELVIC/BREAST EXAM: HCPCS | Mod: S$PBB,,, | Performed by: OBSTETRICS & GYNECOLOGY

## 2025-03-13 PROCEDURE — 99999 PR PBB SHADOW E&M-EST. PATIENT-LVL III: CPT | Mod: PBBFAC,,, | Performed by: OBSTETRICS & GYNECOLOGY

## 2025-03-13 PROCEDURE — 3074F SYST BP LT 130 MM HG: CPT | Mod: CPTII,,, | Performed by: OBSTETRICS & GYNECOLOGY

## 2025-03-13 PROCEDURE — 3008F BODY MASS INDEX DOCD: CPT | Mod: CPTII,,, | Performed by: OBSTETRICS & GYNECOLOGY

## 2025-03-13 PROCEDURE — 3078F DIAST BP <80 MM HG: CPT | Mod: CPTII,,, | Performed by: OBSTETRICS & GYNECOLOGY

## 2025-03-13 PROCEDURE — 1101F PT FALLS ASSESS-DOCD LE1/YR: CPT | Mod: CPTII,,, | Performed by: OBSTETRICS & GYNECOLOGY

## 2025-03-13 PROCEDURE — 1160F RVW MEDS BY RX/DR IN RCRD: CPT | Mod: CPTII,,, | Performed by: OBSTETRICS & GYNECOLOGY

## 2025-03-13 RX ORDER — AMLODIPINE BESYLATE 5 MG/1
5 TABLET ORAL
COMMUNITY
Start: 2024-12-07

## 2025-03-17 ENCOUNTER — TELEPHONE (OUTPATIENT)
Dept: OBSTETRICS AND GYNECOLOGY | Facility: CLINIC | Age: 70
End: 2025-03-17
Payer: MEDICARE

## 2025-03-17 RX ORDER — ALENDRONATE SODIUM 70 MG/1
70 TABLET ORAL
Qty: 12 TABLET | Refills: 3 | Status: SHIPPED | OUTPATIENT
Start: 2025-03-17 | End: 2026-03-17

## 2025-03-17 RX ORDER — ESTRADIOL 0.1 MG/G
1 CREAM VAGINAL
Qty: 42.5 G | Refills: 2 | Status: SHIPPED | OUTPATIENT
Start: 2025-03-17 | End: 2026-03-17

## 2025-03-17 NOTE — TELEPHONE ENCOUNTER
Left message with number for imaging so pt can schedule DXA scan.       ----- Message from Trista sent at 3/14/2025  1:54 PM CDT -----  Regarding: bone denisty  Who Called: Chaya Villafuerte is requesting assistance/information from provider's office.Symptoms (please be specific): Patient would like a bone density for this year and would like for it to be scheduled on Monday since that regular day off  Please call her back with the appointmentPreferred Method of Contact: Phone CallPatient's Preferred Phone Number on File: [phone] Best Call Back Number, if different:Additional Information:

## (undated) DEVICE — CUTTER TOMCAT 4X125MM

## (undated) DEVICE — GLOVE SURGICAL PROTEXIS PI CLASSIC SIZE 6.5

## (undated) DEVICE — WARMER BLUE HEAT SCOPE 3-12MM

## (undated) DEVICE — SUT 2/0 36IN COATED VICRYL

## (undated) DEVICE — SUT JJ41G O VICRYL

## (undated) DEVICE — SUT 0 36IN COATED VICRYL VI

## (undated) DEVICE — TUBE SUCTION MEDI-VAC STERILE

## (undated) DEVICE — CANISTER SUCTION 12000ML DISPOSABLE

## (undated) DEVICE — SEALER LIGASURE MARYLAND 37CM

## (undated) DEVICE — SOL IRRIGATION SALINE 3000ML BAG

## (undated) DEVICE — ADHESIVE MASTISOL VIAL 48/BX

## (undated) DEVICE — SUT VICRYL 2-0 36 CT-1

## (undated) DEVICE — GOWN NONREINF SET-IN SLV 2XL

## (undated) DEVICE — GLOVE SENSICARE PI GRN 6.5

## (undated) DEVICE — TUBING ARTHRO STRYKER

## (undated) DEVICE — TAPE DRAPE STRIP CLSR 4.5X24IN

## (undated) DEVICE — GOWN POLY REINF BRTH SLV XL

## (undated) DEVICE — COUNT NDL FOAM MAGNET 40COUNT

## (undated) DEVICE — SUT VICRYL+ 27 UR-6 VIOL

## (undated) DEVICE — CANNULA LAP SEAL Z THRD 5X100

## (undated) DEVICE — NDL ACCESS 14GA

## (undated) DEVICE — SPONGE GAUZE 16PLY 4X4

## (undated) DEVICE — IRRIGATOR ENDOSCOPY DISP.

## (undated) DEVICE — SOL NACL IRR 3000ML

## (undated) DEVICE — GOWN SURGICAL SMARTGOWN LEVEL 4 / EXTRA LARGE STERILE

## (undated) DEVICE — PENCIL ELECTROSURG HOLST W/BLD

## (undated) DEVICE — CDS KNEE ARTHROSCOPY

## (undated) DEVICE — SOL NACL IRR 1000ML BTL

## (undated) DEVICE — DRESSING ABD SURGIPAD 8X7.5IN

## (undated) DEVICE — TOWEL OR DISP STRL BLUE 4/PK

## (undated) DEVICE — WATER BOOM FLOOR SUCTION STRIP

## (undated) DEVICE — PROBE SUCTION AARDVARK 2.5X135MM

## (undated) DEVICE — TOURNIQUET CUFF DISP QC 34 INCH

## (undated) DEVICE — DRAPE THREE-QTR REINF 53X77IN

## (undated) DEVICE — BANDAGE GAUZE COT STRL 4.5X4.1

## (undated) DEVICE — GLOVE SENSICARE PI SURG 6.5

## (undated) DEVICE — TRAY CATH FOL SIL URIMTR 16FR

## (undated) DEVICE — Device

## (undated) DEVICE — KIT GYN LAPAROSCOPY RUSH

## (undated) DEVICE — SUT 4-0 VICRYL / FS-2

## (undated) DEVICE — APPLICATOR CHLORAPREP HI-LITE TINTED ORANGE 26ML

## (undated) DEVICE — BANDAGE ELASTIC FLEX-MASTER 6INX11YD STL DBL LNGTH

## (undated) DEVICE — SET CYSTO IRR DRP CHMBR 84IN

## (undated) DEVICE — STRIP MEDI WND CLSR 1/4X4IN

## (undated) DEVICE — DISSECTOR KITTNER 5MM T 45CM S

## (undated) DEVICE — GLOVE SENSICARE PI SURG 8

## (undated) DEVICE — GLOVE SURGICAL PROTEXIS PI BLUE SIZE 7.5

## (undated) DEVICE — TROCAR ENDO Z THREAD KII 5X100

## (undated) DEVICE — GLOVE SURGICAL PROTEXIS PI CLASSIC SIZE 7.5

## (undated) DEVICE — SLING HALO SHAPE OBTRYX II
Type: IMPLANTABLE DEVICE | Site: PERITONEUM | Status: NON-FUNCTIONAL
Removed: 2024-05-20